# Patient Record
Sex: FEMALE | Race: WHITE | NOT HISPANIC OR LATINO | Employment: FULL TIME | URBAN - METROPOLITAN AREA
[De-identification: names, ages, dates, MRNs, and addresses within clinical notes are randomized per-mention and may not be internally consistent; named-entity substitution may affect disease eponyms.]

---

## 2023-10-20 PROBLEM — L71.9 ROSACEA, UNSPECIFIED: Status: ACTIVE | Noted: 2018-08-17

## 2023-10-20 PROBLEM — R20.2 PARESTHESIA OF SKIN: Status: ACTIVE | Noted: 2018-08-17

## 2023-10-20 PROBLEM — I83.12 VARICOSE VEINS OF LEFT LOWER EXTREMITY WITH INFLAMMATION: Status: ACTIVE | Noted: 2018-08-17

## 2023-10-20 PROBLEM — M25.569 KNEE JOINT PAIN: Status: ACTIVE | Noted: 2023-03-01

## 2023-10-20 PROBLEM — L85.8 OTHER SPECIFIED EPIDERMAL THICKENING: Status: ACTIVE | Noted: 2018-08-17

## 2023-10-20 RX ORDER — DOXYCYCLINE 40 MG/1
40 CAPSULE ORAL
COMMUNITY
Start: 2017-04-07 | End: 2024-02-19 | Stop reason: WASHOUT

## 2023-10-20 RX ORDER — ESTRADIOL 0.1 MG/G
1 CREAM VAGINAL
COMMUNITY
Start: 2023-08-20 | End: 2023-11-18

## 2023-10-20 RX ORDER — AMMONIUM LACTATE 12 G/100G
1 CREAM TOPICAL
COMMUNITY
Start: 2017-04-07 | End: 2024-02-19 | Stop reason: WASHOUT

## 2023-10-20 RX ORDER — SERTRALINE HYDROCHLORIDE 100 MG/1
100 TABLET, FILM COATED ORAL DAILY
COMMUNITY
Start: 2022-11-01

## 2023-10-20 RX ORDER — CAMPHOR AND MENTHOL 5; 5 MG/ML; MG/ML
1 LOTION TOPICAL
COMMUNITY
Start: 2018-06-15 | End: 2024-02-19 | Stop reason: WASHOUT

## 2023-10-20 RX ORDER — METRONIDAZOLE 7.5 MG/G
CREAM TOPICAL
COMMUNITY
Start: 2018-06-15 | End: 2024-03-27 | Stop reason: ALTCHOICE

## 2023-10-20 RX ORDER — DOXYCYCLINE 100 MG/1
100 CAPSULE ORAL
COMMUNITY
Start: 2016-07-18 | End: 2024-02-19 | Stop reason: WASHOUT

## 2023-10-23 ENCOUNTER — OFFICE VISIT (OUTPATIENT)
Dept: ORTHOPEDIC SURGERY | Facility: CLINIC | Age: 58
End: 2023-10-23
Payer: COMMERCIAL

## 2023-10-23 DIAGNOSIS — M17.12 PRIMARY OSTEOARTHRITIS OF LEFT KNEE: Primary | ICD-10-CM

## 2023-10-23 DIAGNOSIS — M25.562 ARTHRALGIA OF LEFT KNEE: ICD-10-CM

## 2023-10-23 PROCEDURE — 99214 OFFICE O/P EST MOD 30 MIN: CPT | Performed by: FAMILY MEDICINE

## 2023-10-23 PROCEDURE — 99204 OFFICE O/P NEW MOD 45 MIN: CPT | Performed by: FAMILY MEDICINE

## 2023-10-23 PROCEDURE — 2500000005 HC RX 250 GENERAL PHARMACY W/O HCPCS: Performed by: FAMILY MEDICINE

## 2023-10-23 PROCEDURE — 20611 DRAIN/INJ JOINT/BURSA W/US: CPT | Mod: LT | Performed by: FAMILY MEDICINE

## 2023-10-23 PROCEDURE — 2500000004 HC RX 250 GENERAL PHARMACY W/ HCPCS (ALT 636 FOR OP/ED): Performed by: FAMILY MEDICINE

## 2023-10-23 PROCEDURE — 2500000004 HC RX 250 GENERAL PHARMACY W/ HCPCS (ALT 636 FOR OP/ED)

## 2023-10-23 RX ORDER — INDOMETHACIN 75 MG/1
75 CAPSULE, EXTENDED RELEASE ORAL
Qty: 60 CAPSULE | Refills: 1 | Status: SHIPPED | OUTPATIENT
Start: 2023-10-23 | End: 2023-12-11 | Stop reason: SDUPTHER

## 2023-10-23 NOTE — LETTER
October 27, 2023     Rocky Logan MD  [id number] [facility] [address]  [facility] [address]    Patient: Macie Gavin   YOB: 1965   Date of Visit: 10/23/2023       Dear Dr. Rocky Logan MD:    Thank you for referring Macie aGvin to me for evaluation. Below are my notes for this consultation.  If you have questions, please do not hesitate to call me. I look forward to following your patient along with you.       Sincerely,     Chuck Bess MD      CC: Isreal Lares MD  ______________________________________________________________________________________    Sports Medicine Office Note    Today's Date:  10/23/2023     HPI: Macie Gavin is a 57 y.o.  who presents today for evaluation of left knee pain and swelling upon referral by her rheumatologist.    She complains of over 6 months of left knee pain that began in March 2023 when cleaning house and kneeling.  She denies direct injury or trauma.  She describes anterior medial pain with standing from a sitting position.  She has 0/10 pain at rest.  She does have daily pain with standing.  She gets occasional swelling.  She gets crackling.  She is afraid she has a loose body.  She previously had treatment with oral steroids and then meloxicam by her PCP.  She stopped the meloxicam 3 weeks ago.  She recently had an MRI of her left knee and was referred here for further treatment.  She denies recent or remote injury or trauma to this knee.  She has no problems with the opposite knee.    She has no other complaints.    Review of Systems  Constitutional: no fever, no chills, not feeling tired, no recent weight gain and no recent weight loss.   ENT: no nosebleeds.   Cardiovascular: no chest pain.   Respiratory: no shortness of breath and no cough.   Gastrointestinal: no abdominal pain, no nausea, no diarrhea and no vomiting.   Musculoskeletal: as noted in HPI and no arthralgias.   Integumentary: no rashes and no skin wound.    Neurological: no headache.   Psychiatric: no sleep disturbances and no depression.   Endocrine: no muscle weakness and no muscle cramps.   Hematologic/Lymphatic: no swollen glands and no tendency for easy bruising.    Physical Examination:     The RIGHT knee is nontender and stable.  The LEFT knee has trace to grade 1 joint effusion. Patella crepitus and grind are positive. There is no tenderness to the medial and lateral joint lines.  Michelle's is negative.  Flexion and extension are without mechanical blocking. There is no instability with stress testing.   Skin - no rashes, sores, or open lesions. Strength, sensory and vascular exams are otherwise normal. There is no clubbing, cyanosis or edema.  Gait is slightly antalgic and tandem.    Constitutional: General appearance = Alert and in no acute distress. Well developed, well nourished.   Head and face: Normal.    Eyes: External Eye, Conjunctiva and Lids=Normal external exam; pupils were equal in size, round, reactive to light (PERRL) and extraocular movements intact (EOMI).   Ears, Nose, Mouth, and Throat: External inspection of ears and nose=Normal.   Hearing= Normal.    Neck: No neck mass was observed. Supple.   Pulmonary: Respiratory effort = No respiratory distress.   Cardiovascular: Examination of extremities= No peripheral edema.   Skin and subcutaneous tissue: Normal skin color and pigmentation, normal skin turgor, and no rash; palpation of skin and subcutaneous tissue=Normal.    Neurologic: Sensation= Normal.    Psychiatric: Judgment and insight= Intact.  Orientation to person, place, and time: Alert and oriented x 3.  Mood and affect= Normal.    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  The studies were reviewed by me personally in the office today.    Procedure:  After consent was obtained, the left knee was prepped in a sterile fashion.  Ultrasound guidance was used to help insure proper needle placement into the knee joint, decrease patient discomfort, and decrease collateral damage. The joint was visualized and Depo-Medrol 80 mg with lidocaine 4 mL & ropivacaine 4 mL were injected without any complications. Ultrasound images were saved on an internal file for later reference. The patient tolerated the procedure well and the area was cleaned and bandaged.    Problem List Items Addressed This Visit             ICD-10-CM    Knee joint pain M25.569    Relevant Orders    Point of Care Ultrasound (Completed)    Primary osteoarthritis of left knee - Primary M17.12    Relevant Medications    indomethacin SR (Indocin SR) 75 mg ER capsule       Assessment and Plan:     We reviewed the exam and x-ray findings and discussed the conservative and surgical treatment options. We agreed to treat her knee pain and swelling conservatively at this time.  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  She is not a candidate for surgery at this time.  She should do well with the cortisone injection.  We will start her on indomethacin for breakthrough pain.  She will follow-up in 1 month or sooner for recheck.    **This note was dictated using Dragon speech recognition software and was not corrected for spelling or grammatical errors**.    Chuck Bess MD  Sports Medicine Specialist  [facility] [place]

## 2023-10-23 NOTE — PROGRESS NOTES
NPV- L knee pain    Patient ID: Macie Gavin is a 57 y.o. female.    L Inj/Asp: L knee on 10/27/2023 8:00 AM  Indications: pain  Details: 22 G needle, ultrasound-guided superolateral approach  Medications: 4 mL lidocaine 10 mg/mL (1 %); 4 mL ROPivacaine (PF) 100 mg/20 mL (5 mg/mL) 0.5 %; 80 mg methylPREDNISolone acetate 80 mg/mL  Procedure, treatment alternatives, risks and benefits explained, specific risks discussed. Consent was given by the patient. Immediately prior to procedure a time out was called to verify the correct patient, procedure, equipment, support staff and site/side marked as required. Patient was prepped and draped in the usual sterile fashion.

## 2023-10-26 NOTE — PROGRESS NOTES
Sports Medicine Office Note    Today's Date:  10/23/2023     HPI: Macie Gavin is a 57 y.o.  who presents today for evaluation of left knee pain and swelling upon referral by her rheumatologist.    She complains of over 6 months of left knee pain that began in March 2023 when cleaning house and kneeling.  She denies direct injury or trauma.  She describes anterior medial pain with standing from a sitting position.  She has 0/10 pain at rest.  She does have daily pain with standing.  She gets occasional swelling.  She gets crackling.  She is afraid she has a loose body.  She previously had treatment with oral steroids and then meloxicam by her PCP.  She stopped the meloxicam 3 weeks ago.  She recently had an MRI of her left knee and was referred here for further treatment.  She denies recent or remote injury or trauma to this knee.  She has no problems with the opposite knee.    She has no other complaints.    Review of Systems  Constitutional: no fever, no chills, not feeling tired, no recent weight gain and no recent weight loss.   ENT: no nosebleeds.   Cardiovascular: no chest pain.   Respiratory: no shortness of breath and no cough.   Gastrointestinal: no abdominal pain, no nausea, no diarrhea and no vomiting.   Musculoskeletal: as noted in HPI and no arthralgias.   Integumentary: no rashes and no skin wound.   Neurological: no headache.   Psychiatric: no sleep disturbances and no depression.   Endocrine: no muscle weakness and no muscle cramps.   Hematologic/Lymphatic: no swollen glands and no tendency for easy bruising.    Physical Examination:     The RIGHT knee is nontender and stable.  The LEFT knee has trace to grade 1 joint effusion. Patella crepitus and grind are positive. There is no tenderness to the medial and lateral joint lines.  Michelle's is negative.  Flexion and extension are without mechanical blocking. There is no instability with stress testing.   Skin - no rashes, sores, or  open lesions. Strength, sensory and vascular exams are otherwise normal. There is no clubbing, cyanosis or edema.  Gait is slightly antalgic and tandem.    Constitutional: General appearance = Alert and in no acute distress. Well developed, well nourished.   Head and face: Normal.    Eyes: External Eye, Conjunctiva and Lids=Normal external exam; pupils were equal in size, round, reactive to light (PERRL) and extraocular movements intact (EOMI).   Ears, Nose, Mouth, and Throat: External inspection of ears and nose=Normal.   Hearing= Normal.    Neck: No neck mass was observed. Supple.   Pulmonary: Respiratory effort = No respiratory distress.   Cardiovascular: Examination of extremities= No peripheral edema.   Skin and subcutaneous tissue: Normal skin color and pigmentation, normal skin turgor, and no rash; palpation of skin and subcutaneous tissue=Normal.    Neurologic: Sensation= Normal.    Psychiatric: Judgment and insight= Intact.  Orientation to person, place, and time: Alert and oriented x 3.  Mood and affect= Normal.    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  The studies were reviewed by me personally in the office today.    Procedure:  After consent was obtained, the left knee was prepped in a sterile fashion. Ultrasound guidance was used to help insure proper needle placement into the knee joint, decrease patient discomfort, and decrease collateral damage. The joint was visualized and Depo-Medrol 80 mg with lidocaine 4 mL & ropivacaine 4 mL were injected without any complications. Ultrasound images were saved on an internal file for later reference. The patient tolerated the procedure well and the area was cleaned and bandaged.    Problem List Items Addressed This Visit             ICD-10-CM    Knee joint pain M25.569    Relevant Orders    Point of Care Ultrasound (Completed)    Primary osteoarthritis  of left knee - Primary M17.12    Relevant Medications    indomethacin SR (Indocin SR) 75 mg ER capsule       Assessment and Plan:     We reviewed the exam and x-ray findings and discussed the conservative and surgical treatment options. We agreed to treat her knee pain and swelling conservatively at this time.  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  She is not a candidate for surgery at this time.  She should do well with the cortisone injection.  We will start her on indomethacin for breakthrough pain.  She will follow-up in 1 month or sooner for recheck.    **This note was dictated using Dragon speech recognition software and was not corrected for spelling or grammatical errors**.    Chuck Bess MD  Sports Medicine Specialist  [facility] [place]

## 2023-10-27 PROCEDURE — 2500000005 HC RX 250 GENERAL PHARMACY W/O HCPCS: Performed by: FAMILY MEDICINE

## 2023-10-27 PROCEDURE — 2500000004 HC RX 250 GENERAL PHARMACY W/ HCPCS (ALT 636 FOR OP/ED): Performed by: FAMILY MEDICINE

## 2023-10-27 PROCEDURE — 20611 DRAIN/INJ JOINT/BURSA W/US: CPT | Performed by: FAMILY MEDICINE

## 2023-10-27 RX ORDER — LIDOCAINE HYDROCHLORIDE 10 MG/ML
4 INJECTION INFILTRATION; PERINEURAL
Status: COMPLETED | OUTPATIENT
Start: 2023-10-27 | End: 2023-10-27

## 2023-10-27 RX ORDER — ROPIVACAINE HCL/PF 100MG/20ML
4 SYRINGE (ML) INJECTION
Status: COMPLETED | OUTPATIENT
Start: 2023-10-27 | End: 2023-10-27

## 2023-10-27 RX ORDER — METHYLPREDNISOLONE ACETATE 80 MG/ML
80 INJECTION, SUSPENSION INTRA-ARTICULAR; INTRALESIONAL; INTRAMUSCULAR; SOFT TISSUE
Status: COMPLETED | OUTPATIENT
Start: 2023-10-27 | End: 2023-10-27

## 2023-10-27 RX ADMIN — Medication 4 ML: at 08:00

## 2023-10-27 RX ADMIN — LIDOCAINE HYDROCHLORIDE 4 ML: 10 INJECTION, SOLUTION INFILTRATION; PERINEURAL at 08:00

## 2023-10-27 RX ADMIN — METHYLPREDNISOLONE ACETATE 80 MG: 80 INJECTION, SUSPENSION INTRA-ARTICULAR; INTRALESIONAL; INTRAMUSCULAR; SOFT TISSUE at 08:00

## 2023-12-11 ENCOUNTER — ANCILLARY PROCEDURE (OUTPATIENT)
Dept: RADIOLOGY | Facility: CLINIC | Age: 58
End: 2023-12-11
Payer: COMMERCIAL

## 2023-12-11 ENCOUNTER — OFFICE VISIT (OUTPATIENT)
Dept: ORTHOPEDIC SURGERY | Facility: CLINIC | Age: 58
End: 2023-12-11
Payer: COMMERCIAL

## 2023-12-11 DIAGNOSIS — M17.12 PRIMARY OSTEOARTHRITIS OF LEFT KNEE: ICD-10-CM

## 2023-12-11 DIAGNOSIS — M25.562 ARTHRALGIA OF LEFT KNEE: ICD-10-CM

## 2023-12-11 PROCEDURE — 99214 OFFICE O/P EST MOD 30 MIN: CPT | Performed by: FAMILY MEDICINE

## 2023-12-11 PROCEDURE — 73564 X-RAY EXAM KNEE 4 OR MORE: CPT | Mod: LT,FY

## 2023-12-11 PROCEDURE — 73564 X-RAY EXAM KNEE 4 OR MORE: CPT | Mod: LEFT SIDE | Performed by: RADIOLOGY

## 2023-12-11 RX ORDER — INDOMETHACIN 75 MG/1
75 CAPSULE, EXTENDED RELEASE ORAL
Qty: 60 CAPSULE | Refills: 1 | Status: SHIPPED | OUTPATIENT
Start: 2023-12-11 | End: 2024-02-09

## 2023-12-11 NOTE — PROGRESS NOTES
Sports Medicine Office Note    Today's Date:  12/11/2023     HPI: Macie Gavin is a 58 y.o.  who presents today for follow-up of left knee pain status post cortisone injection.    On 10/23/2023 she presents for Evaluation of left knee pain and swelling upon referral by her rheumatologist.  She complains of over 6 months of left knee pain that began in March 2023 when cleaning house and kneeling.  She denies direct injury or trauma.  She describes anterior medial pain with standing from a sitting position.  She has 0/10 pain at rest.  She does have daily pain with standing.  She gets occasional swelling.  She gets crackling.  She is afraid she has a loose body.  She previously had treatment with oral steroids and then meloxicam by her PCP.  She stopped the meloxicam 3 weeks ago.  She recently had an MRI of her left knee and was referred here for further treatment.  She denies recent or remote injury or trauma to this knee.  She has no problems with the opposite knee.  We agreed to treat her knee pain and swelling conservatively at this time.  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  She is not a candidate for surgery at this time.  She should do well with the cortisone injection.  We will start her on indomethacin for breakthrough pain.  She will follow-up in 1 month or sooner for recheck.    Today, 2/11/2023, she returns for 6-week follow-up of her left knee pain.  She reports 1.5 weeks of 100% improvement of her left knee pain after the cortisone injection.  At that point, the shot wore off and it has progressively worsened back to her baseline pain before the injection.  She was taking indomethacin once or twice a day for a month and then ran out.  She was told by the pharmacy she cannot get a refill until a few days from now.  She denies interval injury or trauma.  She is frustrated with her lack of improvement.    She has no  other complaints.      Physical Examination:     The RIGHT knee is nontender and stable.  The LEFT knee has trace  joint effusion. Patella crepitus and grind are positive. There is no tenderness to the medial and lateral joint lines.  Michelle's is negative.  Flexion and extension are without mechanical blocking. There is no instability with stress testing.   Skin - no rashes, sores, or open lesions. Strength, sensory and vascular exams are otherwise normal. There is no clubbing, cyanosis or edema.  Gait is slightly antalgic and tandem.      Imaging:  Radiographs of the left knee obtained today were reviewed and revealed moderate medial compartment and patellofemoral compartment osteoarthrosis.  There are no signs of acute fractures or dislocations.  The studies were reviewed by me personally in the office today.    Problem List Items Addressed This Visit             ICD-10-CM    Knee joint pain M25.569    Relevant Medications    indomethacin SR (Indocin SR) 75 mg ER capsule    Other Relevant Orders    XR knee left 4+ views    Primary osteoarthritis of left knee M17.12    Relevant Medications    indomethacin SR (Indocin SR) 75 mg ER capsule       Assessment and Plan:     We reviewed the exam and x-ray findings and discussed the conservative and surgical treatment options. We agreed that her relief from cortisone was very minimal.  I do not feel she is in need of joint replacement but clearly has a degenerative medial meniscus tear seen on recent MRI from an outside facility.  I recommend a surgical consultation with Dr. Sexton to see if she would benefit from partial meniscectomy before trying viscosupplementation.  She was given a refill of indomethacin.  I am happy to see her back to try gel injections in the future.    **This note was dictated using Dragon speech recognition software and was not corrected for spelling or grammatical errors**.    Chuck Bess MD  Sports Medicine Specialist  [facility]  [facility] [place]

## 2024-01-10 ENCOUNTER — TELEPHONE (OUTPATIENT)
Dept: ORTHOPEDIC SURGERY | Facility: HOSPITAL | Age: 59
End: 2024-01-10
Payer: COMMERCIAL

## 2024-01-15 ENCOUNTER — APPOINTMENT (OUTPATIENT)
Dept: ORTHOPEDIC SURGERY | Facility: CLINIC | Age: 59
End: 2024-01-15
Payer: COMMERCIAL

## 2024-01-16 ENCOUNTER — OFFICE VISIT (OUTPATIENT)
Dept: ORTHOPEDIC SURGERY | Facility: CLINIC | Age: 59
End: 2024-01-16
Payer: COMMERCIAL

## 2024-01-16 DIAGNOSIS — M23.204 OLD TEAR OF MEDIAL MENISCUS OF LEFT KNEE, UNSPECIFIED TEAR TYPE: Primary | ICD-10-CM

## 2024-01-16 PROCEDURE — 99213 OFFICE O/P EST LOW 20 MIN: CPT | Performed by: STUDENT IN AN ORGANIZED HEALTH CARE EDUCATION/TRAINING PROGRAM

## 2024-01-16 PROCEDURE — 1036F TOBACCO NON-USER: CPT | Performed by: STUDENT IN AN ORGANIZED HEALTH CARE EDUCATION/TRAINING PROGRAM

## 2024-01-16 ASSESSMENT — PAIN SCALES - GENERAL: PAINLEVEL_OUTOF10: 3

## 2024-01-16 ASSESSMENT — PAIN - FUNCTIONAL ASSESSMENT: PAIN_FUNCTIONAL_ASSESSMENT: 0-10

## 2024-01-16 ASSESSMENT — PAIN DESCRIPTION - DESCRIPTORS: DESCRIPTORS: BURNING

## 2024-02-01 NOTE — PROGRESS NOTES
PRIMARY CARE PHYSICIAN: Rocky Logan MD  REFERRING PROVIDER: No referring provider defined for this encounter.     CONSULT ORTHOPAEDIC: Knee Evaluation    ASSESSMENT & PLAN    Impression/Plan: This is a 58-year-old female with several months of left medial sided knee pain.  Clinical history, physical examination, and MRI imaging confirms radial tear of the medial meniscus with chondral defect of the medial tibial plateau.  At this time, we have elected to move forward with operative management provider a course of a home exercise program as provided by the [facility].  She will follow-up in roughly 6 weeks time.  For further evaluation and discussion of further management options.  I have also asked her to return with a copy of her MRI so I can evaluate personally at the next visit.      SUBJECTIVE  CHIEF COMPLAINT:   Chief Complaint   Patient presents with    Left Knee - Pain     NPV - Ref by Dr. Bess for torn meniscus of left knee.         HPI: Macie Gavin is a 58 y.o. patient.  The patient presents for several months of left medial sided knee pain.  She does report mechanical symptoms of clicking and popping.  There is no previous history of injury, surgery to the knee.  No history of injections to the knee.  She does take occasional oral anti-inflammatories for pain relief.  She comes with an MRI report from the [facility], however we are unable to see the images today.  MRI report is read as radial tear of the medial meniscus, chondral defect of the medial plateau, and chondromalacia of the patella.  She presents today for further evaluation.      REVIEW OF SYSTEMS  Constitutional: See HPI for pain assessment, No significant weight loss, recent trauma  Cardiovascular: No chest pain, shortness of breath  Respiratory: No difficulty breathing, cough  Gastrointestinal: No nausea, vomiting, diarrhea, constipation  Musculoskeletal: Noted in HPI, positive for pain, restricted motion, stiffness  Integumentary: No  rashes, easy bruising, redness   Neurological: no numbness or tingling in extremities, no gait disturbances   Psychiatric: No mood changes, memory changes, social issues  Heme/Lymph: no excessive swelling, easy bruising, excessive bleeding  ENT: No hearing changes  Eyes: No vision changes    History reviewed. No pertinent past medical history.     No Known Allergies     History reviewed. No pertinent surgical history.     No family history on file.     Social History     Socioeconomic History    Marital status:      Spouse name: Not on file    Number of children: Not on file    Years of education: Not on file    Highest education level: Not on file   Occupational History    Not on file   Tobacco Use    Smoking status: Never    Smokeless tobacco: Never   Substance and Sexual Activity    Alcohol use: Not on file    Drug use: Not on file    Sexual activity: Not on file   Other Topics Concern    Not on file   Social History Narrative    Not on file     Social Determinants of Health     Financial Resource Strain: Not on file   Food Insecurity: Not on file   Transportation Needs: Not on file   Physical Activity: Not on file   Stress: Not on file   Social Connections: Not on file   Intimate Partner Violence: Not on file   Housing Stability: Not on file        CURRENT MEDICATIONS:   Current Outpatient Medications   Medication Sig Dispense Refill    ammonium lactate (Amlactin) 12 % cream Apply 1 Application topically.      camphor-menthoL (Sarna OriginaL) lotion Apply 1 Application topically.      doxycycline (Oracea) 40 mg DR capsule Take 1 capsule (40 mg) by mouth.      doxycycline (Vibramycin) 100 mg capsule Take 1 capsule (100 mg) by mouth.      estradiol (Estrace) 0.01 % (0.1 mg/gram) vaginal cream Insert 1 g into the vagina.  1-2 times per week as directed.      indomethacin SR (Indocin SR) 75 mg ER capsule Take 1 capsule (75 mg) by mouth 2 times a day with meals. Do not crush or chew. 60 capsule 1     metroNIDAZOLE (Metrocream) 0.75 % cream 0      sertraline (Zoloft) 100 mg tablet Take 1 tablet (100 mg) by mouth once daily.       No current facility-administered medications for this visit.        OBJECTIVE    PHYSICAL EXAM       No data to display               There is no height or weight on file to calculate BMI.    GENERAL: A/Ox3, NAD. Appears healthy, well nourished  PSYCHIATRIC: Mood stable, appropriate memory recall  EYES: EOM intact, no scleral icterus  CARDIAC: regular rate  LUNGS: Breathing non-labored  SKIN: no erythema, rashes, or ecchymoses     MUSCULOSKELETAL:  Laterality: left Knee Exam  This is a well-appearing patient in no acute distress.  There is no effusion to the knee.  There is mild tenderness to palpation along the medial joint line, no tenderness to palpation along lateral joint line.  Range of motion: 0 to 120 degrees with pain along the medial joint line at terminal flexion.  There is no pain with manipulation of patella.  Negative Lachman's exam.  The knee is stable to posterior stress.  The knee is stable to varus and valgus stress at both 0 and 30 degrees knee flexion.  5/5 Strength TA, EHL, GS    NEUROVASCULAR:  - Neurovascular Status: sensation intact to light touch distally, lower extremity motor intact  - Capillary refill brisk at extremities, Bilateral dorsalis pedis pulse 2+    Imaging: Multiple views of the affected left knee(s) demonstrate: Medial compartmental joint space narrowing.  The lateral compartment appears to be well-preserved.  No evidence of osteophytes within the patellofemoral joint.  No evidence of fracture.  X-rays were personally reviewed and interpreted by me.  Radiology reports were reviewed by me as well, if readily available at the time.        Juan Pablo Guy MD, MPH  Attending Surgeon  Sports Medicine Orthopaedic Surgery  [facility] [place]

## 2024-02-19 ENCOUNTER — OFFICE VISIT (OUTPATIENT)
Dept: DERMATOLOGY | Facility: CLINIC | Age: 59
End: 2024-02-19
Payer: COMMERCIAL

## 2024-02-19 DIAGNOSIS — L64.9 ANDROGENETIC ALOPECIA: ICD-10-CM

## 2024-02-19 DIAGNOSIS — L71.9 ROSACEA: Primary | ICD-10-CM

## 2024-02-19 PROCEDURE — 1036F TOBACCO NON-USER: CPT | Performed by: DERMATOLOGY

## 2024-02-19 PROCEDURE — 99204 OFFICE O/P NEW MOD 45 MIN: CPT | Performed by: DERMATOLOGY

## 2024-02-19 RX ORDER — METRONIDAZOLE 7.5 MG/G
1 CREAM TOPICAL DAILY
Qty: 45 G | Refills: 11 | Status: SHIPPED | OUTPATIENT
Start: 2024-02-19 | End: 2024-03-27 | Stop reason: ALTCHOICE

## 2024-02-19 ASSESSMENT — DERMATOLOGY QUALITY OF LIFE (QOL) ASSESSMENT
RATE HOW BOTHERED YOU ARE BY EFFECTS OF YOUR SKIN PROBLEMS ON YOUR ACTIVITIES (EG, GOING OUT, ACCOMPLISHING WHAT YOU WANT, WORK ACTIVITIES OR YOUR RELATIONSHIPS WITH OTHERS): 0 - NEVER BOTHERED
RATE HOW BOTHERED YOU ARE BY SYMPTOMS OF YOUR SKIN PROBLEM (EG, ITCHING, STINGING BURNING, HURTING OR SKIN IRRITATION): 0 - NEVER BOTHERED
WHAT SINGLE SKIN CONDITION LISTED BELOW IS THE PATIENT ANSWERING THE QUALITY-OF-LIFE ASSESSMENT QUESTIONS ABOUT: ROSACEA
ARE THERE EXCLUSIONS OR EXCEPTIONS FOR THE QUALITY OF LIFE ASSESSMENT: NO
RATE HOW EMOTIONALLY BOTHERED YOU ARE BY YOUR SKIN PROBLEM (FOR EXAMPLE, WORRY, EMBARRASSMENT, FRUSTRATION): 0 - NEVER BOTHERED
DATE THE QUALITY-OF-LIFE ASSESSMENT WAS COMPLETED: 66889

## 2024-02-19 ASSESSMENT — DERMATOLOGY PATIENT ASSESSMENT
ARE YOU AN ORGAN TRANSPLANT RECIPIENT: NO
WHERE ARE THESE NEW OR CHANGING LESIONS LOCATED: HAIR THINNING
HAVE YOU HAD OR DO YOU HAVE VASCULAR DISEASE: NO
HAVE YOU HAD OR DO YOU HAVE A STAPH INFECTION: NO
DO YOU USE A TANNING BED: YES, PREVIOUSLY
DO YOU HAVE ANY NEW OR CHANGING LESIONS: YES

## 2024-02-19 ASSESSMENT — ITCH NUMERIC RATING SCALE: HOW SEVERE IS YOUR ITCHING?: 0

## 2024-02-19 ASSESSMENT — PATIENT GLOBAL ASSESSMENT (PGA): PATIENT GLOBAL ASSESSMENT: PATIENT GLOBAL ASSESSMENT:  1 - CLEAR

## 2024-02-19 NOTE — PROGRESS NOTES
Subjective   Macie Gavin is a 58 y.o. female who presents for the following: Rosacea and Alopecia.    Rosacea  Symptoms have been ongoing for about several years.  The skin changes are primarily located on the face. Her skin changes are described as red bumps. Flushing of her face occurs occasionally. Previous treatment has included metronidazole with good improvement. She is need of refills.    Alopecia  She complains of hair loss. The hair loss is localized in distribution, with onset approximately 2 years ago. She describes symptoms of hair thinning. She denies scalp itch and scalp scaling. She does not have family history of hair loss. She does not have dietary restrictions. She does wear a high tension hair style. She had no serious medical illnesses or major weight loss during time of hair loss. She has not tried any treatment for hair loss or thinning.        Objective   Well appearing patient in no apparent distress; mood and affect are within normal limits.    A focused examination was performed including face and hair. All findings within normal limits unless otherwise noted below.    Head - Anterior (Face)  Mid face erythema with telangiectasias and scattered inflammatory papules.    Scalp  Mild decreased hair density of vertex scalp with no surface changes of the scalp or hair follicles      Assessment/Plan   Rosacea  Head - Anterior (Face)    -Last seen 2018 and has been on metrocream 5 days/week that manages her condition well  -Will provide refills today     metroNIDAZOLE (Metrocream) 0.75 % cream - Head - Anterior (Face)  Apply 1 Application topically once daily.    Androgenetic alopecia  Scalp    -Discussed relationship to hormones, especially during menopause  -Patient has not tried any treatment yet and denies itching or burning  -CBC wnl, patient states last TSH was normal and will be getting another during annual exam in May  -Recommend OTC topical minoxidil 5% 1-2 times daily if  interested in treatment, however counseled that if not bothering patient, she does not need to treat  -Advised patient that if she does start minoxidil and then stops, she may lose any hair that was gained  -Discussed that there are stronger oral treatments, however patient defers these for now    Violet Law MD  PGY-4 Dermatology    RTC in 1 year    I saw and evaluated the patient. I personally obtained the key and critical portions of the history and physical exam or was physically present for key and critical portions performed by the student/resident. I reviewed the student/resident's documentation and discussed the patient with the student/resident. I was present for the entirety of any procedure(s). I agree with the student/resident's medical decision making as documented in the note.

## 2024-02-20 ENCOUNTER — OFFICE VISIT (OUTPATIENT)
Dept: ORTHOPEDIC SURGERY | Facility: CLINIC | Age: 59
End: 2024-02-20
Payer: COMMERCIAL

## 2024-02-20 DIAGNOSIS — M17.12 LOCALIZED OSTEOARTHRITIS OF LEFT KNEE: Primary | ICD-10-CM

## 2024-02-20 PROCEDURE — 1036F TOBACCO NON-USER: CPT | Performed by: STUDENT IN AN ORGANIZED HEALTH CARE EDUCATION/TRAINING PROGRAM

## 2024-02-20 PROCEDURE — 99213 OFFICE O/P EST LOW 20 MIN: CPT | Performed by: STUDENT IN AN ORGANIZED HEALTH CARE EDUCATION/TRAINING PROGRAM

## 2024-02-20 ASSESSMENT — PAIN SCALES - GENERAL: PAINLEVEL_OUTOF10: 0 - NO PAIN

## 2024-02-20 ASSESSMENT — PAIN - FUNCTIONAL ASSESSMENT: PAIN_FUNCTIONAL_ASSESSMENT: 0-10

## 2024-02-20 NOTE — PROGRESS NOTES
PRIMARY CARE PHYSICIAN: Rocky Logan MD  ORTHOPAEDIC FOLLOW-UP: Knee Evaluation    ASSESSMENT & PLAN    Impression: 58 y.o. female with left sided knee pain status post a corticosteroid injection and home exercise program.  She has also seen improvement with medial sided pain with the use of a medial  brace.  I have asked the patient to bring in a copy of her MRI for further evaluation, however I believe she may be a candidate for a left medial sided unicompartmental knee arthroplasty.  She will bring a copy of her MRI to the office for further evaluation I will contact her to discuss further management options.  All questions were answered.    Plan:   I reviewed with the patient the nature of their diagnosis.  I reviewed their imaging studies with them.    Based on the history, physical exam and imaging studies above, the patient's presentation is consistent with consistent with the above diagnosis.  I had a long discussion with the patient regarding their presentation and the treatment options.  We discussed initial nonoperative versus operative management options as well as potential further diagnostic imaging.     The patient will bring a copy of her MRI for further evaluation and I will contact her following my interpretation.    Follow-Up: Patient will follow-up following evaluation of MRI study.    At the end of the visit, all questions were answered in full. The patient is in agreement with the plan and recommendations. They will call the office with any questions/concerns.    Note dictated with [employer] software. Completed without full typed error editing and sent to avoid delay.     SUBJECTIVE  CHIEF COMPLAINT:   Chief Complaint   Patient presents with    Left Knee - Follow-up     Discuss possible surgery for left knee.         HPI: Macie Gavin is a 58 y.o. patient. Macie Gavin presents for follow-up of left medial sided knee pain.  She was previously evaluated on  1/16/2024 and diagnosed with a left knee medial sided chondral defect and radial meniscal tearing.  She was provided a home exercise program which she has completed with minimal relief of her symptoms.  She has achiness and occasional clicking/popping to the medial aspect of the knee.  She reports she does have a history of a single corticosteroid injection provided roughly 6 months ago with minimal relief of her symptoms over the course of 4 days.  She has been in a medial  brace which has relieved her symptoms, however she feels the brace is too bulky to continue utilizing.    She is interested in pursuing potential surgical intervention for this knee.  She does have a trip potentially planned for April and is trying to determine if she would prefer surgery for that date and postpone her vacation, or hold off on surgery until after her vacation.  She denies any acute injuries to the knee.    REVIEW OF SYSTEMS  Constitutional: See HPI for pain assessment, No significant weight loss, recent trauma  Cardiovascular: No chest pain, shortness of breath  Respiratory: No difficulty breathing, cough  Gastrointestinal: No nausea, vomiting, diarrhea, constipation  Musculoskeletal: Noted in HPI, positive for pain, restricted motion, stiffness  Integumentary: No rashes, easy bruising, redness   Neurological: no numbness or tingling in extremities, no gait disturbances   Psychiatric: No mood changes, memory changes, social issues  Heme/Lymph: no excessive swelling, easy bruising, excessive bleeding  ENT: No hearing changes  Eyes: No vision changes    History reviewed. No pertinent past medical history.     No Known Allergies     History reviewed. No pertinent surgical history.     No family history on file.     Social History     Socioeconomic History    Marital status:      Spouse name: Not on file    Number of children: Not on file    Years of education: Not on file    Highest education level: Not on file    Occupational History    Not on file   Tobacco Use    Smoking status: Never    Smokeless tobacco: Never   Substance and Sexual Activity    Alcohol use: Yes     Comment: Mixed drinks at least once a month    Drug use: Not on file    Sexual activity: Not on file   Other Topics Concern    Not on file   Social History Narrative    Not on file     Social Determinants of Health     Financial Resource Strain: Not on file   Food Insecurity: Not on file   Transportation Needs: Not on file   Physical Activity: Not on file   Stress: Not on file   Social Connections: Not on file   Intimate Partner Violence: Not on file   Housing Stability: Not on file        CURRENT MEDICATIONS:   Current Outpatient Medications   Medication Sig Dispense Refill    metroNIDAZOLE (Metrocream) 0.75 % cream Apply 1 Application topically once daily. 45 g 11    sertraline (Zoloft) 100 mg tablet Take 1 tablet (100 mg) by mouth once daily.      estradiol (Estrace) 0.01 % (0.1 mg/gram) vaginal cream Insert 1 g into the vagina.  1-2 times per week as directed.      metroNIDAZOLE (Metrocream) 0.75 % cream 0       No current facility-administered medications for this visit.        OBJECTIVE    PHYSICAL EXAM       No data to display               There is no height or weight on file to calculate BMI.    GENERAL: A/Ox3, NAD. Appears healthy, well nourished  PSYCHIATRIC: Mood stable, appropriate memory recall  EYES: EOM intact, no scleral icterus  CARDIOVASCULAR: Palpable peripheral pulses  LUNGS: Breathing non-labored on room air  SKIN: no erythema, rashes, or ecchymoses     MUSCULOSKELETAL:  Laterality: left Knee Exam  - Skin intact  - No erythema or warmth  - No ecchymosis or soft tissue swelling  - Alignment: neutral  - Palpation: There is significant tenderness palpation along the medial joint line.  No tenderness laterally or with manipulation of the patellofemoral joint.  - ROM: 0-120 with pain along the medial joint with terminal flexion.  - Effusion:  None.    - Strength: knee extension and flexion 5/5, EHL/PF/DF motor intact  - Stability:        Anterior drawer stable       Posterior drawer stable       Varus/valgus stable       negative Lachman  - Hip Exam: flexion to 100+ degrees, full extension, internal/external rotation adequate, and no pain with log roll    NEUROVASCULAR:  - Neurovascular Status: sensation intact to light touch distally, lower extremity motor intact  - Capillary refill brisk at extremities, Bilateral dorsalis pedis pulse 2+    Imaging: Multiple views of the affected left knee(s) demonstrate: Medial compartment joint space narrowing with subchondral sclerosis.  Lateral and patellofemoral compartments appear to be well-preserved.   X-rays were personally reviewed and interpreted by me.  Radiology reports were reviewed by me as well, if readily available at the time.      Juan aPblo Guy MD, MPH  Attending Surgeon    Sports Medicine Orthopaedic Surgery  [facility] [place]  [facility]

## 2024-02-27 ENCOUNTER — APPOINTMENT (OUTPATIENT)
Dept: ORTHOPEDIC SURGERY | Facility: CLINIC | Age: 59
End: 2024-02-27
Payer: COMMERCIAL

## 2024-02-27 ENCOUNTER — DOCUMENTATION (OUTPATIENT)
Dept: ORTHOPEDICS | Facility: HOSPITAL | Age: 59
End: 2024-02-27

## 2024-02-27 DIAGNOSIS — M17.10 LOCALIZED OSTEOARTHRITIS OF KNEE: Primary | ICD-10-CM

## 2024-02-27 NOTE — PROGRESS NOTES
This is documentation of a telephone conversation held with the patient on 2/27/2024.    The patient presented her MRI which was available for my evaluation.  There appears to be a complex/degenerative medial meniscus tear with chondral thinning of articular surface.  The lateral and patellofemoral compartments appear to be well-preserved.  The ACL and PCL are intact.    At this time, we discussed that I believe she would be an appropriate candidate for a left knee unicompartmental medial sided knee arthroplasty.  She has elected to hold off on a planned trip in April and would like to proceed with surgical intervention.  Will plan to obtain a CT for preoperative planning purposes, she will need to be evaluated by her primary care provider, and we will work on scheduling surgery date.  All questions were answered.    Terry Guy MD, MPH

## 2024-03-10 ENCOUNTER — HOSPITAL ENCOUNTER (OUTPATIENT)
Dept: RADIOLOGY | Facility: EXTERNAL LOCATION | Age: 59
Discharge: HOME | End: 2024-03-10

## 2024-03-12 ENCOUNTER — APPOINTMENT (OUTPATIENT)
Dept: RADIOLOGY | Facility: CLINIC | Age: 59
End: 2024-03-12
Payer: COMMERCIAL

## 2024-03-12 ENCOUNTER — HOSPITAL ENCOUNTER (OUTPATIENT)
Dept: RADIOLOGY | Facility: HOSPITAL | Age: 59
Discharge: HOME | End: 2024-03-12
Payer: COMMERCIAL

## 2024-03-12 DIAGNOSIS — M17.10 LOCALIZED OSTEOARTHRITIS OF KNEE: ICD-10-CM

## 2024-03-12 PROCEDURE — 73700 CT LOWER EXTREMITY W/O DYE: CPT | Mod: LT

## 2024-03-15 ENCOUNTER — PREP FOR PROCEDURE (OUTPATIENT)
Dept: ORTHOPEDIC SURGERY | Facility: CLINIC | Age: 59
End: 2024-03-15
Payer: COMMERCIAL

## 2024-03-15 DIAGNOSIS — M17.12 ARTHRITIS OF LEFT KNEE: Primary | ICD-10-CM

## 2024-03-20 ENCOUNTER — APPOINTMENT (OUTPATIENT)
Dept: PREADMISSION TESTING | Facility: HOSPITAL | Age: 59
End: 2024-03-20
Payer: COMMERCIAL

## 2024-03-25 ENCOUNTER — TELEPHONE (OUTPATIENT)
Dept: ORTHOPEDIC SURGERY | Facility: HOSPITAL | Age: 59
End: 2024-03-25
Payer: COMMERCIAL

## 2024-03-25 NOTE — TELEPHONE ENCOUNTER
I left a voicemail for Ms. Gavin  to call and schedule her preop joint replacement class at [phone].

## 2024-03-25 NOTE — TELEPHONE ENCOUNTER
Thank you for taking my call today.  All questions were answered at the time of the call, but please feel free to reach out to me via MyChart or phone, [phone], with any new questions or concerns.       We confirmed that you opted to enroll in our [id number] program so your discharge prescriptions will be available to take home at the time of discharge.  Please bring any prescription insurance coverage with you on the morning of surgery so that we can enter the information into our system.     We confirmed that your plan would be to Discharge Home same day (Rapid Recovery).    We confirmed that you need additional DME for recovery and have been referred to DME tech for additional assistance. As discussed with our medical equipment rep, Ale, she can get you crutches on the day of surgery.     Use the provided body wash for 4 days before surgery and complete a 5th shower on the morning of surgery, this includes your body and hair.  Follow the directions as provided during preadmission testing.  The mouth wash will be used the night before and the morning of surgery.       As a reminder, if you do not hear from our team, please call [phone] between 2pm and 3pm the business day before your surgery to confirm your arrival time and details.        Please don't hesitate to reach out with additional questions or concerns.    Idalia Atkins MBA, BSN, RN-BC  ESN Jarvis, RN  [facility]  [facility]  [phone]

## 2024-03-27 ENCOUNTER — LAB (OUTPATIENT)
Dept: LAB | Facility: LAB | Age: 59
End: 2024-03-27
Payer: COMMERCIAL

## 2024-03-27 ENCOUNTER — APPOINTMENT (OUTPATIENT)
Dept: PREADMISSION TESTING | Facility: HOSPITAL | Age: 59
End: 2024-03-27
Payer: COMMERCIAL

## 2024-03-27 ENCOUNTER — PRE-ADMISSION TESTING (OUTPATIENT)
Dept: PREADMISSION TESTING | Facility: HOSPITAL | Age: 59
End: 2024-03-27
Payer: COMMERCIAL

## 2024-03-27 ENCOUNTER — TELEPHONE (OUTPATIENT)
Dept: ORTHOPEDIC SURGERY | Facility: HOSPITAL | Age: 59
End: 2024-03-27
Payer: COMMERCIAL

## 2024-03-27 VITALS
TEMPERATURE: 98.6 F | OXYGEN SATURATION: 98 % | HEART RATE: 72 BPM | HEIGHT: 64 IN | BODY MASS INDEX: 32.71 KG/M2 | WEIGHT: 191.6 LBS | DIASTOLIC BLOOD PRESSURE: 79 MMHG | SYSTOLIC BLOOD PRESSURE: 122 MMHG

## 2024-03-27 DIAGNOSIS — Z01.818 PREOP TESTING: Primary | ICD-10-CM

## 2024-03-27 DIAGNOSIS — Z01.818 PREOP TESTING: ICD-10-CM

## 2024-03-27 LAB
ANION GAP SERPL CALC-SCNC: 15 MMOL/L
APPEARANCE UR: CLEAR
BILIRUB UR STRIP.AUTO-MCNC: NEGATIVE MG/DL
BUN SERPL-MCNC: 13 MG/DL (ref 8–25)
CALCIUM SERPL-MCNC: 9.7 MG/DL (ref 8.5–10.4)
CHLORIDE SERPL-SCNC: 104 MMOL/L (ref 97–107)
CO2 SERPL-SCNC: 24 MMOL/L (ref 24–31)
COLOR UR: ABNORMAL
CREAT SERPL-MCNC: 0.8 MG/DL (ref 0.4–1.6)
EGFRCR SERPLBLD CKD-EPI 2021: 86 ML/MIN/1.73M*2
GLUCOSE SERPL-MCNC: 85 MG/DL (ref 65–99)
GLUCOSE UR STRIP.AUTO-MCNC: NORMAL MG/DL
KETONES UR STRIP.AUTO-MCNC: NEGATIVE MG/DL
LEUKOCYTE ESTERASE UR QL STRIP.AUTO: ABNORMAL
MUCOUS THREADS #/AREA URNS AUTO: NORMAL /LPF
NITRITE UR QL STRIP.AUTO: NEGATIVE
PH UR STRIP.AUTO: 5.5 [PH]
POTASSIUM SERPL-SCNC: 4.8 MMOL/L (ref 3.4–5.1)
PROT UR STRIP.AUTO-MCNC: NEGATIVE MG/DL
RBC # UR STRIP.AUTO: ABNORMAL /UL
RBC #/AREA URNS AUTO: NORMAL /HPF
SODIUM SERPL-SCNC: 143 MMOL/L (ref 133–145)
SP GR UR STRIP.AUTO: 1.02
SQUAMOUS #/AREA URNS AUTO: NORMAL /HPF
UROBILINOGEN UR STRIP.AUTO-MCNC: NORMAL MG/DL
WBC #/AREA URNS AUTO: NORMAL /HPF

## 2024-03-27 PROCEDURE — 36415 COLL VENOUS BLD VENIPUNCTURE: CPT

## 2024-03-27 PROCEDURE — 80048 BASIC METABOLIC PNL TOTAL CA: CPT

## 2024-03-27 PROCEDURE — 99204 OFFICE O/P NEW MOD 45 MIN: CPT | Performed by: NURSE PRACTITIONER

## 2024-03-27 PROCEDURE — 87086 URINE CULTURE/COLONY COUNT: CPT

## 2024-03-27 PROCEDURE — 81001 URINALYSIS AUTO W/SCOPE: CPT

## 2024-03-27 PROCEDURE — 87081 CULTURE SCREEN ONLY: CPT | Mod: WESLAB

## 2024-03-27 RX ORDER — BUDESONIDE AND FORMOTEROL FUMARATE DIHYDRATE 160; 4.5 UG/1; UG/1
2 AEROSOL RESPIRATORY (INHALATION) DAILY PRN
COMMUNITY

## 2024-03-27 RX ORDER — ERGOCALCIFEROL 1.25 MG/1
1 CAPSULE ORAL
COMMUNITY
Start: 2024-03-08

## 2024-03-27 RX ORDER — IBUPROFEN 800 MG/1
800 TABLET ORAL EVERY 8 HOURS PRN
COMMUNITY

## 2024-03-27 RX ORDER — CHLORHEXIDINE GLUCONATE ORAL RINSE 1.2 MG/ML
SOLUTION DENTAL
Qty: 473 ML | Refills: 0 | Status: SHIPPED | OUTPATIENT
Start: 2024-03-31 | End: 2024-04-01

## 2024-03-27 RX ORDER — MELOXICAM 15 MG/1
15 TABLET ORAL DAILY
COMMUNITY

## 2024-03-27 RX ORDER — ALBUTEROL SULFATE 90 UG/1
2 AEROSOL, METERED RESPIRATORY (INHALATION) EVERY 6 HOURS PRN
COMMUNITY
Start: 2023-04-10

## 2024-03-27 ASSESSMENT — ENCOUNTER SYMPTOMS
EYES NEGATIVE: 1
CONSTITUTIONAL NEGATIVE: 1
HEMATOLOGIC/LYMPHATIC NEGATIVE: 1
RESPIRATORY NEGATIVE: 1
PSYCHIATRIC NEGATIVE: 1
ENDOCRINE NEGATIVE: 1
NEUROLOGICAL NEGATIVE: 1
GASTROINTESTINAL NEGATIVE: 1
CARDIOVASCULAR NEGATIVE: 1

## 2024-03-27 ASSESSMENT — CHADS2 SCORE
AGE GREATER THAN OR EQUAL TO 75: NO
CHADS2 SCORE: 0
CHF: NO
HYPERTENSION: NO
PRIOR STROKE OR TIA OR THROMBOEMBOLISM: NO
DIABETES: NO

## 2024-03-27 ASSESSMENT — DUKE ACTIVITY SCORE INDEX (DASI)
CAN YOU DO LIGHT WORK AROUND THE HOUSE LIKE DUSTING OR WASHING DISHES: YES
CAN YOU WALK INDOORS, SUCH AS AROUND YOUR HOUSE: YES
TOTAL_SCORE: 36.7
CAN YOU DO YARD WORK LIKE RAKING LEAVES, WEEDING OR PUSHING A MOWER: YES
CAN YOU DO HEAVY WORK AROUND THE HOUSE LIKE SCRUBBING FLOORS OR LIFTING AND MOVING HEAVY FURNITURE: YES
CAN YOU WALK A BLOCK OR TWO ON LEVEL GROUND: YES
CAN YOU RUN A SHORT DISTANCE: NO
DASI METS SCORE: 7.3
CAN YOU PARTICIPATE IN MODERATE RECREATIONAL ACTIVITIES LIKE GOLF, BOWLING, DANCING, DOUBLES TENNIS OR THROWING A BASEBALL OR FOOTBALL: NO
CAN YOU DO MODERATE WORK AROUND THE HOUSE LIKE VACUUMING, SWEEPING FLOORS OR CARRYING GROCERIES: YES
CAN YOU TAKE CARE OF YOURSELF (EAT, DRESS, BATHE, OR USE TOILET): YES
CAN YOU PARTICIPATE IN STRENOUS SPORTS LIKE SWIMMING, SINGLES TENNIS, FOOTBALL, BASKETBALL, OR SKIING: NO
CAN YOU CLIMB A FLIGHT OF STAIRS OR WALK UP A HILL: YES
CAN YOU HAVE SEXUAL RELATIONS: YES

## 2024-03-27 ASSESSMENT — PAIN - FUNCTIONAL ASSESSMENT: PAIN_FUNCTIONAL_ASSESSMENT: 0-10

## 2024-03-27 ASSESSMENT — PAIN DESCRIPTION - DESCRIPTORS: DESCRIPTORS: SHARP

## 2024-03-27 ASSESSMENT — PAIN SCALES - GENERAL: PAINLEVEL_OUTOF10: 4

## 2024-03-27 NOTE — H&P (VIEW-ONLY)
CPM/PAT Evaluation       Name: Macie Gavin (Macie Gavin)  /Age: 1965/58 y.o.     In-Person       Chief Complaint: left knee pain    HPI    Pt is a 58 year old female who reports left knee pain that started several months ago but has progressively worsened. Pt stated the left knee pain is primarily along the medial side of her left knee. She describes the pain as an aching pain that radiates up her left thigh. She also reports occasional popping in her knee. She has tried a cortisone injection in her left knee and a home exercise program with no improvement of her knee pain. Pt has been taking Meloxicam and Ibuprofen with some improvement of the pain. Pt completed MRI imaging and was examined by her surgeon. She has been scheduled for left partial knee arthroplasty. Pt denies CP, SOB, or dizziness.     Past Medical History:   Diagnosis Date    Anxiety     Depression     Exercise-induced asthma     Insomnia     OCD (obsessive compulsive disorder)        Past Surgical History:   Procedure Laterality Date    BLADDER SUSPENSION      CERVICAL FUSION       SECTION, LOW TRANSVERSE      COLONOSCOPY      HYSTERECTOMY      TUBAL LIGATION      VAGINAL PROLAPSE REPAIR       Social History     Tobacco Use    Smoking status: Never    Smokeless tobacco: Never   Substance Use Topics    Alcohol use: Yes     Comment: Mixed drinks at least once a month     Social History     Substance and Sexual Activity   Drug Use Never     No Known Allergies    Current Outpatient Medications   Medication Sig Dispense Refill    albuterol 90 mcg/actuation inhaler Inhale 2 puffs every 6 hours if needed.      ergocalciferol (Vitamin D-2) 1.25 MG (06863 UT) capsule Take 1 capsule (1,250 mcg) by mouth 1 (one) time per week. MONDAY      budesonide-formoteroL (Symbicort) 160-4.5 mcg/actuation inhaler Inhale 2 puffs once daily as needed (ASTHMA EXERCISE INDUCED).      [START ON 3/31/2024] chlorhexidine (Peridex) 0.12 % solution  "Use as directed. Do not start before March 31, 2024. 473 mL 0    estradiol (Estrace) 0.01 % (0.1 mg/gram) vaginal cream Insert 1 g into the vagina.  1-2 times per week as directed.      ibuprofen 800 mg tablet Take 1 tablet (800 mg) by mouth every 8 hours if needed for mild pain (1 - 3).      meloxicam (Mobic) 15 mg tablet Take 1 tablet (15 mg) by mouth once daily.      sertraline (Zoloft) 100 mg tablet Take 1 tablet (100 mg) by mouth once daily.       No current facility-administered medications for this visit.     Review of Systems   Constitutional: Negative.    HENT: Negative.     Eyes: Negative.    Respiratory: Negative.     Cardiovascular: Negative.    Gastrointestinal: Negative.    Endocrine: Negative.    Genitourinary: Negative.    Musculoskeletal:         Left knee pain    Skin: Negative.    Neurological: Negative.    Hematological: Negative.    Psychiatric/Behavioral: Negative.       /79   Pulse 72   Temp 37 °C (98.6 °F) (Temporal)   Ht 1.626 m (5' 4\")   Wt 86.9 kg (191 lb 9.6 oz)   SpO2 98%   BMI 32.89 kg/m²     Physical Exam  Constitutional:       Appearance: Normal appearance.   HENT:      Head: Normocephalic and atraumatic.      Nose: Nose normal.      Mouth/Throat:      Mouth: Mucous membranes are moist.      Pharynx: Oropharynx is clear.   Eyes:      Extraocular Movements: Extraocular movements intact.      Conjunctiva/sclera: Conjunctivae normal.      Pupils: Pupils are equal, round, and reactive to light.   Cardiovascular:      Rate and Rhythm: Normal rate and regular rhythm.      Pulses: Normal pulses.      Heart sounds: Normal heart sounds.   Pulmonary:      Effort: Pulmonary effort is normal.      Breath sounds: Normal breath sounds.   Abdominal:      General: Bowel sounds are normal.      Palpations: Abdomen is soft.   Musculoskeletal:      Cervical back: Normal range of motion and neck supple.      Comments: Left knee pain   Skin:     General: Skin is warm and dry.   Neurological: "      General: No focal deficit present.      Mental Status: She is alert and oriented to person, place, and time. Mental status is at baseline.   Psychiatric:         Mood and Affect: Mood normal.         Behavior: Behavior normal.         Thought Content: Thought content normal.         Judgment: Judgment normal.        ASA: 2  DASI: 36.7  METS: 7.3  CHADS: 1.9%  RCRI: 0.4%  STOP BAN    Assessment and Plan:     Arthritis of left knee: arthroplasty partial knee left  Exercise induced asthma: Pt is uses Symbicort and albuterol inhalers as needed.  Pt stated she usually only has to use her inhalers when she is outside in cold weather and with exercise.   Pt went to the ED in February for intermittent CP that lasted 3 days. ED performed workup and nothing was found. Pt stated her CP symptoms relieved after she had BM.   BMI: 32.89    PCP Dr. Logan clearance scanned into EPIC media.   Retrieve EKG from Dr. Logan's office scanned into [employer] Media scanned on 3/27/2024 under heading physician order.   CBC completed on 2024  Checking BMP in PAT.     ERICH Beyer-CNP

## 2024-03-27 NOTE — TELEPHONE ENCOUNTER
Talked to Mrs. Gavin and let her know that she is required to get her PAT's done, but if she brings in her documentation they likely will not need to order repeat labs/imaging. She has an appointment today @ 1:45 pm.

## 2024-03-27 NOTE — CPM/PAT H&P
CPM/PAT Evaluation       Name: Macie Gavin (Macie Gavin)  /Age: 1965/58 y.o.     In-Person       Chief Complaint: left knee pain    HPI    Pt is a 58 year old female who reports left knee pain that started several months ago but has progressively worsened. Pt stated the left knee pain is primarily along the medial side of her left knee. She describes the pain as an aching pain that radiates up her left thigh. She also reports occasional popping in her knee. She has tried a cortisone injection in her left knee and a home exercise program with no improvement of her knee pain. Pt has been taking Meloxicam and Ibuprofen with some improvement of the pain. Pt completed MRI imaging and was examined by her surgeon. She has been scheduled for left partial knee arthroplasty. Pt denies CP, SOB, or dizziness.     Past Medical History:   Diagnosis Date    Anxiety     Depression     Exercise-induced asthma     Insomnia     OCD (obsessive compulsive disorder)        Past Surgical History:   Procedure Laterality Date    BLADDER SUSPENSION      CERVICAL FUSION       SECTION, LOW TRANSVERSE      COLONOSCOPY      HYSTERECTOMY      TUBAL LIGATION      VAGINAL PROLAPSE REPAIR       Social History     Tobacco Use    Smoking status: Never    Smokeless tobacco: Never   Substance Use Topics    Alcohol use: Yes     Comment: Mixed drinks at least once a month     Social History     Substance and Sexual Activity   Drug Use Never     No Known Allergies    Current Outpatient Medications   Medication Sig Dispense Refill    albuterol 90 mcg/actuation inhaler Inhale 2 puffs every 6 hours if needed.      ergocalciferol (Vitamin D-2) 1.25 MG (59136 UT) capsule Take 1 capsule (1,250 mcg) by mouth 1 (one) time per week. MONDAY      budesonide-formoteroL (Symbicort) 160-4.5 mcg/actuation inhaler Inhale 2 puffs once daily as needed (ASTHMA EXERCISE INDUCED).      [START ON 3/31/2024] chlorhexidine (Peridex) 0.12 % solution  "Use as directed. Do not start before March 31, 2024. 473 mL 0    estradiol (Estrace) 0.01 % (0.1 mg/gram) vaginal cream Insert 1 g into the vagina.  1-2 times per week as directed.      ibuprofen 800 mg tablet Take 1 tablet (800 mg) by mouth every 8 hours if needed for mild pain (1 - 3).      meloxicam (Mobic) 15 mg tablet Take 1 tablet (15 mg) by mouth once daily.      sertraline (Zoloft) 100 mg tablet Take 1 tablet (100 mg) by mouth once daily.       No current facility-administered medications for this visit.     Review of Systems   Constitutional: Negative.    HENT: Negative.     Eyes: Negative.    Respiratory: Negative.     Cardiovascular: Negative.    Gastrointestinal: Negative.    Endocrine: Negative.    Genitourinary: Negative.    Musculoskeletal:         Left knee pain    Skin: Negative.    Neurological: Negative.    Hematological: Negative.    Psychiatric/Behavioral: Negative.       /79   Pulse 72   Temp 37 °C (98.6 °F) (Temporal)   Ht 1.626 m (5' 4\")   Wt 86.9 kg (191 lb 9.6 oz)   SpO2 98%   BMI 32.89 kg/m²     Physical Exam  Constitutional:       Appearance: Normal appearance.   HENT:      Head: Normocephalic and atraumatic.      Nose: Nose normal.      Mouth/Throat:      Mouth: Mucous membranes are moist.      Pharynx: Oropharynx is clear.   Eyes:      Extraocular Movements: Extraocular movements intact.      Conjunctiva/sclera: Conjunctivae normal.      Pupils: Pupils are equal, round, and reactive to light.   Cardiovascular:      Rate and Rhythm: Normal rate and regular rhythm.      Pulses: Normal pulses.      Heart sounds: Normal heart sounds.   Pulmonary:      Effort: Pulmonary effort is normal.      Breath sounds: Normal breath sounds.   Abdominal:      General: Bowel sounds are normal.      Palpations: Abdomen is soft.   Musculoskeletal:      Cervical back: Normal range of motion and neck supple.      Comments: Left knee pain   Skin:     General: Skin is warm and dry.   Neurological: "      General: No focal deficit present.      Mental Status: She is alert and oriented to person, place, and time. Mental status is at baseline.   Psychiatric:         Mood and Affect: Mood normal.         Behavior: Behavior normal.         Thought Content: Thought content normal.         Judgment: Judgment normal.        ASA: 2  DASI: 36.7  METS: 7.3  CHADS: 1.9%  RCRI: 0.4%  STOP BAN    Assessment and Plan:     Arthritis of left knee: arthroplasty partial knee left  Exercise induced asthma: Pt is uses Symbicort and albuterol inhalers as needed.  Pt stated she usually only has to use her inhalers when she is outside in cold weather and with exercise.   Pt went to the ED in February for intermittent CP that lasted 3 days. ED performed workup and nothing was found. Pt stated her CP symptoms relieved after she had BM.   BMI: 32.89    PCP Dr. Logan clearance scanned into EPIC media.   Retrieve EKG from Dr. Logan's office scanned into [employer] Media scanned on 3/27/2024 under heading physician order.   CBC completed on 2024  Checking BMP in PAT.     ERICH Beyer-CNP

## 2024-03-27 NOTE — PREPROCEDURE INSTRUCTIONS
Medication List            Accurate as of March 27, 2024  1:50 PM. Always use your most recent med list.                albuterol 90 mcg/actuation inhaler  Notes to patient: BRING TO HOSPITAL     budesonide-formoteroL 160-4.5 mcg/actuation inhaler  Commonly known as: Symbicort  Notes to patient: BRING TO HOSPITAL     ergocalciferol 1.25 MG (36980 UT) capsule  Commonly known as: Vitamin D-2  Medication Adjustments for Surgery: Stop 7 days before surgery     estradiol 0.01 % (0.1 mg/gram) vaginal cream  Commonly known as: Estrace  Notes to patient: PATIENT NOT USING     ibuprofen 800 mg tablet  Medication Adjustments for Surgery: Stop 7 days before surgery     meloxicam 15 mg tablet  Commonly known as: Mobic  Medication Adjustments for Surgery: Stop 7 days before surgery     sertraline 100 mg tablet  Commonly known as: Zoloft  Medication Adjustments for Surgery: Take morning of surgery with sip of water, no other fluids                              NPO Instructions:    Do not eat any food after midnight the night before your surgery/procedure.  You may have clear liquids until TWO hours before surgery/procedure. This includes water, black tea/coffee, (no milk or cream) apple juice and electrolyte drinks (Gatorade).    Additional Instructions:     Day of Surgery:  Review your medication instructions, take indicated medications  Wear  comfortable loose fitting clothing  Do not use moisturizers, creams, lotions or perfume  All jewelry and valuables should be left at home   Home Preoperative Antibacterial Shower    What is a home preoperative antibacterial shower?  This shower is a way of cleaning the skin with a germ killing solution before surgery. The solution contains chlorhexidine, commonly known as CHG. CHG is a skin cleanser with germ killing ability. Let your doctor know if you are allergic to chlorhexidine.      Why do I need to take a preoperative antibacterial shower?  Skin is not sterile. It is best to try  to make your skin as free of germs as possible before surgery. Proper cleansing with a germ killing soap before surgery can lower the number of germs on your skin. This helps to reduce the risk of infection at the surgical site. Following the instructions listed below will help you prepare your skin for surgery.    How do I use the solution?      Steps: Begin using your CHG soap MARCH 28  First, wash and rinse your hair Keep CHG away soap away from ear canals and eyes.  Rinse completely, do not condition. Hair extensions should be removed.  Wash your face with your normal soap and rinse.  Apply the CHG solution to a clean wet washcloth. Turn the water off or move away from the water spray to avoid premature rinsing of the CHG soap as you are applying.  Firmly lather your entire body from neck down. Do not use on face.  Pay special attention to the areas(s) where your incision(s) will be located unless they are on your face.  Avoid scrubbing your skin too hard.  The important point is to have the CHG soap sit on your skin for 3 minutes.  DO NOT wash with regular soap after you have used the CHG soap solution.  Pat yourself dry with a clean, freshly laundered towel.  DO NOT apply powders, deodorants or lotions.  Dress in clean, freshly laundered night clothes.  Be sure to sleep with clean, freshly laundered sheets.  Be aware that CHG will cause stains on fabrics; if you wash them with bleach after use. Rinse your washcloth and other linens that have contact with CHG completely. Use only non-chlorine detergents to launder the items used.  The morning of surgery is the fifth day. Repeat the above steps and dress in clean comfortable clothing.   Patient Information: Oral/Dental Rinse    What is oral/dental rinse?  It is a mouthwash. It is a way of cleaning the mouth with a germ killing solution before your surgery. The solution contains chlorhexidine, commonly know as CHG.  It is used inside the mouth to kill bacteria  known as Staphylococcus aureus.  Let your doctor know if you are allergic to chlorhexidine.    Why do I need to use CHG oral/dental rinse?  The CHG oral/dental rinse helps to kill bacteria in your mouth known as Staphylococcus aureus. This reduces the risk of infection at the surgical site.    Using your CHG oral/dental rinse. AT YOUR PHARMACY  STEPS: use your CHG oral/dental rinse after you brush your teeth the night before (at bedtime) and the morning of your surgery. Follow the directions on your prescription label.  Use the cap on the container to measure 15ml (fill cap to fill line)  Swish (gargle if you can) the mouthwash in your mouth for at least 30 seconds, (do not swallow) spit out.  After you use your CHG rinse, do not rinse your mouth with water, drink or eat. Please refer to prescription label for the appropriate time to resume oral intake.    What side effects might I have using the CHG oral/dental rinse?  CHG rinse will stick to the plaque on the teeth. Brush and floss just before use. Teeth brushing will help avoid staining of plaque during use.  PAT DISCHARGE INSTRUCTIONS    Please call the Same Day Surgery (SDS) Department of the hospital where your procedure will be performed after 2:00 PM the day before your surgery. If you are scheduled on a Monday, or a Tuesday following a Monday holiday, you will need to call on the last business day prior to your surgery.    CALL FRIDAY    [facility]  [address] [place] [address]  [phone]    Please let your surgeon know if:      You develop any open sores, shingles, burning or painful urination as these may increase your risk of an infection.   You no longer wish to have the surgery.   Any other personal circumstances change that may lead to the need to cancel or defer this surgery-such as being sick or getting admitted to any hospital within one week of your planned procedure.    Your contact details  change, such as a change of address or phone number.    Starting now:     Please DO NOT drink alcohol or smoke for 24 hours before surgery. It is well known that quitting smoking can make a huge difference to your health and recovery from surgery. The longer you abstain from smoking, the better your chances of a healthy recovery. If you need help with quitting, call 5-968-QUIT-NOW to be connected to a trained counselor who will discuss the best methods to help you quit.     Before your surgery:    Please stop all supplements 7 days prior to surgery. Or as directed by your surgeon.   Please stop taking NSAID pain medicine such as Advil and Motrin 7 days before surgery.    If you develop any fever, cough, cold, rashes, cuts, scratches, scrapes, urinary symptoms or infection anywhere on your body (including teeth and gums) prior to surgery, please call your surgeon’s office as soon as possible. This may require treatment to reduce the chance of cancellation on the day of surgery.    The day before your surgery:   DIET- Please follow the diet instructions at the top of your packet.   Get a good night’s rest.  Use the special soap for bathing if you have been instructed to use one.    Scheduled surgery times may change and you will be notified if this occurs - please check your personal voicemail for any updates.     On the morning of surgery:   Wear comfortable, loose fitting clothes which open in the front. Please do not wear moisturizers, creams, lotions, makeup or perfume.    Please bring with you to surgery:   Photo ID and insurance card   Current list of medicines and allergies   Pacemaker/ Defibrillator/Heart stent cards   CPAP machine and mask    Slings/ splints/ crutches   A copy of your complete advanced directive/DHPOA.    Please do NOT bring with you to surgery:   All jewelry and valuables should be left at home.   Prosthetic devices such as contact lenses, hearing aids, dentures, eyelash extensions, hairpins  and body piercings must be removed prior to going in to the surgical suite.    After outpatient surgery:   A responsible adult MUST accompany you at the time of discharge and stay with you for 24 hours after your surgery. You may NOT drive yourself home after surgery.    Do not drive, operate machinery, make critical decisions or do activities that require co-ordination or balance until after a night’s sleep.   Do not drink alcoholic beverages for 24 hours.   Instructions for resuming your medications will be provided by your surgeon.    CALL YOUR DOCTOR AFTER SURGERY IF YOU HAVE:     Chills and/or a fever of 101° F or higher.    Redness, swelling, pus or drainage from your surgical wound or a bad smell from the wound.    Lightheadedness, fainting or confusion.    Persistent vomiting (throwing up) and are not able to eat or drink for 12 hours.    Three or more loose, watery bowel movements in 24 hours (diarrhea).   Difficulty or pain while urinating( after non-urological surgery)    Pain and swelling in your legs, especially if it is only on one side.    Difficulty breathing or are breathing faster than normal.    Any new concerning symptoms.    Who should I contact if I have questions about the CHG oral/dental rinse?  Please call [facility], [facility] at [phone] if you have any questions.    Who should I call if I have any questions regarding the use of CHG soap?  Call the [facility] Center for [facility] at [phone] if you have any questions.

## 2024-03-28 LAB
BACTERIA UR CULT: NORMAL
HOLD SPECIMEN: NORMAL

## 2024-03-29 ENCOUNTER — TELEPHONE (OUTPATIENT)
Dept: ORTHOPEDIC SURGERY | Facility: HOSPITAL | Age: 59
End: 2024-03-29
Payer: COMMERCIAL

## 2024-03-29 ENCOUNTER — ANESTHESIA EVENT (OUTPATIENT)
Dept: OPERATING ROOM | Facility: HOSPITAL | Age: 59
End: 2024-03-29
Payer: COMMERCIAL

## 2024-03-29 LAB — STAPHYLOCOCCUS SPEC CULT: NORMAL

## 2024-03-29 NOTE — TELEPHONE ENCOUNTER
I left a voice message on Mrs. Gavin's phone letting her know that her surgery for 4/1/24 is still happening, but due to a documentation error there is Peer to Peer that needs to be done in the morning before her surgery can take place.   Stage 15: Additional Anesthesia Type: 1% lidocaine with epinephrine

## 2024-04-01 ENCOUNTER — PREP FOR PROCEDURE (OUTPATIENT)
Dept: ORTHOPEDIC SURGERY | Facility: CLINIC | Age: 59
End: 2024-04-01

## 2024-04-01 ENCOUNTER — ANESTHESIA (OUTPATIENT)
Dept: OPERATING ROOM | Facility: HOSPITAL | Age: 59
End: 2024-04-01
Payer: COMMERCIAL

## 2024-04-01 ENCOUNTER — HOSPITAL ENCOUNTER (OUTPATIENT)
Facility: HOSPITAL | Age: 59
Setting detail: OUTPATIENT SURGERY
Discharge: HOME | End: 2024-04-01
Attending: STUDENT IN AN ORGANIZED HEALTH CARE EDUCATION/TRAINING PROGRAM | Admitting: STUDENT IN AN ORGANIZED HEALTH CARE EDUCATION/TRAINING PROGRAM
Payer: COMMERCIAL

## 2024-04-01 ENCOUNTER — APPOINTMENT (OUTPATIENT)
Dept: RADIOLOGY | Facility: HOSPITAL | Age: 59
End: 2024-04-01
Payer: COMMERCIAL

## 2024-04-01 ENCOUNTER — PHARMACY VISIT (OUTPATIENT)
Dept: PHARMACY | Facility: CLINIC | Age: 59
End: 2024-04-01
Payer: MEDICARE

## 2024-04-01 VITALS
SYSTOLIC BLOOD PRESSURE: 133 MMHG | RESPIRATION RATE: 16 BRPM | BODY MASS INDEX: 32.74 KG/M2 | TEMPERATURE: 97.5 F | HEIGHT: 64 IN | OXYGEN SATURATION: 95 % | HEART RATE: 74 BPM | WEIGHT: 191.8 LBS | DIASTOLIC BLOOD PRESSURE: 74 MMHG

## 2024-04-01 DIAGNOSIS — M17.12 ARTHRITIS OF LEFT KNEE: Primary | ICD-10-CM

## 2024-04-01 DIAGNOSIS — Z96.652 AFTERCARE FOLLOWING LEFT KNEE JOINT REPLACEMENT SURGERY: Primary | ICD-10-CM

## 2024-04-01 DIAGNOSIS — Z47.1 AFTERCARE FOLLOWING LEFT KNEE JOINT REPLACEMENT SURGERY: Primary | ICD-10-CM

## 2024-04-01 DIAGNOSIS — M17.12 PRIMARY OSTEOARTHRITIS OF LEFT KNEE: ICD-10-CM

## 2024-04-01 PROCEDURE — RXMED WILLOW AMBULATORY MEDICATION CHARGE

## 2024-04-01 PROCEDURE — 7100000002 HC RECOVERY ROOM TIME - EACH INCREMENTAL 1 MINUTE: Performed by: STUDENT IN AN ORGANIZED HEALTH CARE EDUCATION/TRAINING PROGRAM

## 2024-04-01 PROCEDURE — C1776 JOINT DEVICE (IMPLANTABLE): HCPCS | Performed by: STUDENT IN AN ORGANIZED HEALTH CARE EDUCATION/TRAINING PROGRAM

## 2024-04-01 PROCEDURE — 2500000001 HC RX 250 WO HCPCS SELF ADMINISTERED DRUGS (ALT 637 FOR MEDICARE OP): Performed by: ANESTHESIOLOGY

## 2024-04-01 PROCEDURE — 2720000007 HC OR 272 NO HCPCS: Performed by: STUDENT IN AN ORGANIZED HEALTH CARE EDUCATION/TRAINING PROGRAM

## 2024-04-01 PROCEDURE — 3600000005 HC OR TIME - INITIAL BASE CHARGE - PROCEDURE LEVEL FIVE: Performed by: STUDENT IN AN ORGANIZED HEALTH CARE EDUCATION/TRAINING PROGRAM

## 2024-04-01 PROCEDURE — 3700000001 HC GENERAL ANESTHESIA TIME - INITIAL BASE CHARGE: Performed by: STUDENT IN AN ORGANIZED HEALTH CARE EDUCATION/TRAINING PROGRAM

## 2024-04-01 PROCEDURE — 2500000004 HC RX 250 GENERAL PHARMACY W/ HCPCS (ALT 636 FOR OP/ED): Performed by: ANESTHESIOLOGY

## 2024-04-01 PROCEDURE — 73560 X-RAY EXAM OF KNEE 1 OR 2: CPT | Mod: LT

## 2024-04-01 PROCEDURE — 2500000004 HC RX 250 GENERAL PHARMACY W/ HCPCS (ALT 636 FOR OP/ED): Performed by: NURSE ANESTHETIST, CERTIFIED REGISTERED

## 2024-04-01 PROCEDURE — 73560 X-RAY EXAM OF KNEE 1 OR 2: CPT | Mod: LEFT SIDE | Performed by: RADIOLOGY

## 2024-04-01 PROCEDURE — 3600000010 HC OR TIME - EACH INCREMENTAL 1 MINUTE - PROCEDURE LEVEL FIVE: Performed by: STUDENT IN AN ORGANIZED HEALTH CARE EDUCATION/TRAINING PROGRAM

## 2024-04-01 PROCEDURE — A4649 SURGICAL SUPPLIES: HCPCS | Performed by: STUDENT IN AN ORGANIZED HEALTH CARE EDUCATION/TRAINING PROGRAM

## 2024-04-01 PROCEDURE — 2500000005 HC RX 250 GENERAL PHARMACY W/O HCPCS: Performed by: NURSE ANESTHETIST, CERTIFIED REGISTERED

## 2024-04-01 PROCEDURE — 7100000009 HC PHASE TWO TIME - INITIAL BASE CHARGE: Performed by: STUDENT IN AN ORGANIZED HEALTH CARE EDUCATION/TRAINING PROGRAM

## 2024-04-01 PROCEDURE — 94760 N-INVAS EAR/PLS OXIMETRY 1: CPT

## 2024-04-01 PROCEDURE — 3700000002 HC GENERAL ANESTHESIA TIME - EACH INCREMENTAL 1 MINUTE: Performed by: STUDENT IN AN ORGANIZED HEALTH CARE EDUCATION/TRAINING PROGRAM

## 2024-04-01 PROCEDURE — 97110 THERAPEUTIC EXERCISES: CPT | Mod: GP

## 2024-04-01 PROCEDURE — 2780000003 HC OR 278 NO HCPCS: Performed by: STUDENT IN AN ORGANIZED HEALTH CARE EDUCATION/TRAINING PROGRAM

## 2024-04-01 PROCEDURE — 97116 GAIT TRAINING THERAPY: CPT | Mod: GP

## 2024-04-01 PROCEDURE — 97161 PT EVAL LOW COMPLEX 20 MIN: CPT | Mod: GP

## 2024-04-01 PROCEDURE — 7100000001 HC RECOVERY ROOM TIME - INITIAL BASE CHARGE: Performed by: STUDENT IN AN ORGANIZED HEALTH CARE EDUCATION/TRAINING PROGRAM

## 2024-04-01 PROCEDURE — 27446 REVISION OF KNEE JOINT: CPT | Performed by: STUDENT IN AN ORGANIZED HEALTH CARE EDUCATION/TRAINING PROGRAM

## 2024-04-01 PROCEDURE — 2500000005 HC RX 250 GENERAL PHARMACY W/O HCPCS: Performed by: STUDENT IN AN ORGANIZED HEALTH CARE EDUCATION/TRAINING PROGRAM

## 2024-04-01 PROCEDURE — 7100000010 HC PHASE TWO TIME - EACH INCREMENTAL 1 MINUTE: Performed by: STUDENT IN AN ORGANIZED HEALTH CARE EDUCATION/TRAINING PROGRAM

## 2024-04-01 DEVICE — SIMPLEX® HV IS A FAST-SETTING ACRYLIC RESIN FOR USE IN BONE SURGERY. MIXING THE TWO SEPARATE STERILE COMPONENTS PRODUCES A DUCTILE BONE CEMENT WHICH, AFTER HARDENING, FIXES THE IMPLANT AND TRANSFERS STRESSES PRODUCED DURING MOVEMENT EVENLY TO THE BONE. SIMPLEX® HV CEMENT POWDER ALSO CONTAINS INSOLUBLE ZIRCONIUM DIOXIDE AS AN X-RAY CONTRAST MEDIUM. SIMPLEX® HV DOES NOT EMIT A SIGNAL AND DOES NOT POSE A SAFETY RISK IN A MAGNETIC RESONANCE ENVIRONMENT.
Type: IMPLANTABLE DEVICE | Site: KNEE | Status: FUNCTIONAL
Brand: SIMPLEX HV

## 2024-04-01 DEVICE — BONE PINS (3.2MM X 110MM): Type: IMPLANTABLE DEVICE | Site: KNEE | Status: NON-FUNCTIONAL

## 2024-04-01 RX ORDER — ALBUTEROL SULFATE 0.83 MG/ML
2.5 SOLUTION RESPIRATORY (INHALATION) ONCE AS NEEDED
Status: DISCONTINUED | OUTPATIENT
Start: 2024-04-01 | End: 2024-04-01 | Stop reason: HOSPADM

## 2024-04-01 RX ORDER — MEPERIDINE HYDROCHLORIDE 25 MG/ML
12.5 INJECTION INTRAMUSCULAR; INTRAVENOUS; SUBCUTANEOUS EVERY 10 MIN PRN
Status: DISCONTINUED | OUTPATIENT
Start: 2024-04-01 | End: 2024-04-01 | Stop reason: HOSPADM

## 2024-04-01 RX ORDER — KETOROLAC TROMETHAMINE 15 MG/ML
15 INJECTION, SOLUTION INTRAMUSCULAR; INTRAVENOUS ONCE AS NEEDED
Status: DISCONTINUED | OUTPATIENT
Start: 2024-04-01 | End: 2024-04-01 | Stop reason: HOSPADM

## 2024-04-01 RX ORDER — ONDANSETRON 4 MG/1
4 TABLET, FILM COATED ORAL EVERY 8 HOURS PRN
Qty: 20 TABLET | Refills: 0 | Status: SHIPPED | OUTPATIENT
Start: 2024-04-01

## 2024-04-01 RX ORDER — DIPHENHYDRAMINE HYDROCHLORIDE 50 MG/ML
12.5 INJECTION INTRAMUSCULAR; INTRAVENOUS ONCE AS NEEDED
Status: DISCONTINUED | OUTPATIENT
Start: 2024-04-01 | End: 2024-04-01 | Stop reason: HOSPADM

## 2024-04-01 RX ORDER — HYDRALAZINE HYDROCHLORIDE 20 MG/ML
10 INJECTION INTRAMUSCULAR; INTRAVENOUS EVERY 30 MIN PRN
Status: DISCONTINUED | OUTPATIENT
Start: 2024-04-01 | End: 2024-04-01 | Stop reason: HOSPADM

## 2024-04-01 RX ORDER — ASPIRIN 81 MG/1
81 TABLET ORAL DAILY
Qty: 14 TABLET | Refills: 0 | Status: SHIPPED | OUTPATIENT
Start: 2024-04-01 | End: 2024-04-15

## 2024-04-01 RX ORDER — DOCUSATE SODIUM 100 MG/1
100 CAPSULE, LIQUID FILLED ORAL 2 TIMES DAILY PRN
Qty: 30 CAPSULE | Refills: 0 | Status: SHIPPED | OUTPATIENT
Start: 2024-04-01

## 2024-04-01 RX ORDER — ROPIVACAINE/EPI/CLONIDINE/KET 2.46-0.005
SYRINGE (ML) INJECTION AS NEEDED
Status: DISCONTINUED | OUTPATIENT
Start: 2024-04-01 | End: 2024-04-01 | Stop reason: HOSPADM

## 2024-04-01 RX ORDER — OXYCODONE HCL 10 MG/1
10 TABLET, FILM COATED, EXTENDED RELEASE ORAL ONCE AS NEEDED
Status: DISCONTINUED | OUTPATIENT
Start: 2024-04-01 | End: 2024-04-01 | Stop reason: HOSPADM

## 2024-04-01 RX ORDER — ONDANSETRON HYDROCHLORIDE 2 MG/ML
INJECTION, SOLUTION INTRAVENOUS AS NEEDED
Status: DISCONTINUED | OUTPATIENT
Start: 2024-04-01 | End: 2024-04-01

## 2024-04-01 RX ORDER — FENTANYL CITRATE 50 UG/ML
50 INJECTION, SOLUTION INTRAMUSCULAR; INTRAVENOUS ONCE
Status: COMPLETED | OUTPATIENT
Start: 2024-04-01 | End: 2024-04-01

## 2024-04-01 RX ORDER — PHENYLEPHRINE HCL IN 0.9% NACL 1 MG/10 ML
SYRINGE (ML) INTRAVENOUS AS NEEDED
Status: DISCONTINUED | OUTPATIENT
Start: 2024-04-01 | End: 2024-04-01

## 2024-04-01 RX ORDER — CEFAZOLIN SODIUM 2 G/100ML
INJECTION, SOLUTION INTRAVENOUS AS NEEDED
Status: DISCONTINUED | OUTPATIENT
Start: 2024-04-01 | End: 2024-04-01

## 2024-04-01 RX ORDER — ALBUTEROL SULFATE 0.83 MG/ML
2.5 SOLUTION RESPIRATORY (INHALATION) ONCE
Status: DISCONTINUED | OUTPATIENT
Start: 2024-04-01 | End: 2024-04-01 | Stop reason: HOSPADM

## 2024-04-01 RX ORDER — OXYCODONE HYDROCHLORIDE 5 MG/1
5 TABLET ORAL EVERY 4 HOURS PRN
Qty: 30 TABLET | Refills: 0 | OUTPATIENT
Start: 2024-04-01 | End: 2024-04-08

## 2024-04-01 RX ORDER — MIDAZOLAM HYDROCHLORIDE 1 MG/ML
2 INJECTION, SOLUTION INTRAMUSCULAR; INTRAVENOUS ONCE
Status: COMPLETED | OUTPATIENT
Start: 2024-04-01 | End: 2024-04-01

## 2024-04-01 RX ORDER — FAMOTIDINE 20 MG/1
20 TABLET, FILM COATED ORAL ONCE
Status: COMPLETED | OUTPATIENT
Start: 2024-04-01 | End: 2024-04-01

## 2024-04-01 RX ORDER — SODIUM CHLORIDE, SODIUM LACTATE, POTASSIUM CHLORIDE, CALCIUM CHLORIDE 600; 310; 30; 20 MG/100ML; MG/100ML; MG/100ML; MG/100ML
INJECTION, SOLUTION INTRAVENOUS CONTINUOUS PRN
Status: DISCONTINUED | OUTPATIENT
Start: 2024-04-01 | End: 2024-04-01

## 2024-04-01 RX ORDER — OXYCODONE HYDROCHLORIDE 5 MG/1
5 TABLET ORAL EVERY 6 HOURS PRN
Qty: 30 TABLET | Refills: 0 | Status: SHIPPED | OUTPATIENT
Start: 2024-04-01

## 2024-04-01 RX ORDER — ASPIRIN 325 MG
325 TABLET, DELAYED RELEASE (ENTERIC COATED) ORAL DAILY
Qty: 30 TABLET | Refills: 0 | OUTPATIENT
Start: 2024-04-01 | End: 2024-05-01

## 2024-04-01 RX ORDER — METOCLOPRAMIDE 10 MG/1
10 TABLET ORAL ONCE
Status: COMPLETED | OUTPATIENT
Start: 2024-04-01 | End: 2024-04-01

## 2024-04-01 RX ORDER — TRANEXAMIC ACID 100 MG/ML
INJECTION, SOLUTION INTRAVENOUS AS NEEDED
Status: DISCONTINUED | OUTPATIENT
Start: 2024-04-01 | End: 2024-04-01

## 2024-04-01 RX ORDER — ONDANSETRON HYDROCHLORIDE 2 MG/ML
4 INJECTION, SOLUTION INTRAVENOUS ONCE AS NEEDED
Status: COMPLETED | OUTPATIENT
Start: 2024-04-01 | End: 2024-04-01

## 2024-04-01 RX ORDER — PROPOFOL 10 MG/ML
INJECTION, EMULSION INTRAVENOUS AS NEEDED
Status: DISCONTINUED | OUTPATIENT
Start: 2024-04-01 | End: 2024-04-01

## 2024-04-01 RX ORDER — KETOROLAC TROMETHAMINE 30 MG/ML
INJECTION, SOLUTION INTRAMUSCULAR; INTRAVENOUS AS NEEDED
Status: DISCONTINUED | OUTPATIENT
Start: 2024-04-01 | End: 2024-04-01

## 2024-04-01 RX ORDER — ONDANSETRON 4 MG/1
4 TABLET, FILM COATED ORAL EVERY 8 HOURS PRN
Qty: 20 TABLET | Refills: 0 | OUTPATIENT
Start: 2024-04-01 | End: 2024-04-08

## 2024-04-01 RX ORDER — DOCUSATE SODIUM 100 MG/1
100 CAPSULE, LIQUID FILLED ORAL 2 TIMES DAILY PRN
Qty: 28 CAPSULE | Refills: 0 | OUTPATIENT
Start: 2024-04-01 | End: 2024-04-15

## 2024-04-01 RX ORDER — TRANEXAMIC ACID 650 MG/1
1975 TABLET ORAL ONCE
Status: DISCONTINUED | OUTPATIENT
Start: 2024-04-01 | End: 2024-04-01

## 2024-04-01 RX ORDER — LABETALOL HYDROCHLORIDE 5 MG/ML
10 INJECTION, SOLUTION INTRAVENOUS ONCE AS NEEDED
Status: DISCONTINUED | OUTPATIENT
Start: 2024-04-01 | End: 2024-04-01 | Stop reason: HOSPADM

## 2024-04-01 RX ADMIN — HYDROMORPHONE HYDROCHLORIDE 0.5 MG: 1 INJECTION, SOLUTION INTRAMUSCULAR; INTRAVENOUS; SUBCUTANEOUS at 14:55

## 2024-04-01 RX ADMIN — SODIUM CHLORIDE, POTASSIUM CHLORIDE, SODIUM LACTATE AND CALCIUM CHLORIDE: 600; 310; 30; 20 INJECTION, SOLUTION INTRAVENOUS at 11:30

## 2024-04-01 RX ADMIN — MIDAZOLAM HYDROCHLORIDE 2 MG: 1 INJECTION, SOLUTION INTRAMUSCULAR; INTRAVENOUS at 11:06

## 2024-04-01 RX ADMIN — FENTANYL CITRATE 50 MCG: 50 INJECTION INTRAMUSCULAR; INTRAVENOUS at 11:06

## 2024-04-01 RX ADMIN — Medication 100 MCG: at 12:11

## 2024-04-01 RX ADMIN — PROPOFOL 500 MG: 10 INJECTION, EMULSION INTRAVENOUS at 11:37

## 2024-04-01 RX ADMIN — SODIUM CHLORIDE, POTASSIUM CHLORIDE, SODIUM LACTATE AND CALCIUM CHLORIDE: 600; 310; 30; 20 INJECTION, SOLUTION INTRAVENOUS at 12:35

## 2024-04-01 RX ADMIN — FAMOTIDINE 20 MG: 20 TABLET, FILM COATED ORAL at 09:13

## 2024-04-01 RX ADMIN — CEFAZOLIN SODIUM 2 G: 2 INJECTION, SOLUTION INTRAVENOUS at 11:28

## 2024-04-01 RX ADMIN — KETOROLAC TROMETHAMINE 30 MG: 30 INJECTION, SOLUTION INTRAMUSCULAR; INTRAVENOUS at 13:55

## 2024-04-01 RX ADMIN — PROPOFOL 500 MG: 10 INJECTION, EMULSION INTRAVENOUS at 12:33

## 2024-04-01 RX ADMIN — DEXAMETHASONE SODIUM PHOSPHATE 4 MG: 4 INJECTION, SOLUTION INTRAMUSCULAR; INTRAVENOUS at 11:54

## 2024-04-01 RX ADMIN — Medication 100 MCG: at 12:06

## 2024-04-01 RX ADMIN — Medication 100 MCG: at 11:54

## 2024-04-01 RX ADMIN — Medication 200 MCG: at 11:57

## 2024-04-01 RX ADMIN — ONDANSETRON 4 MG: 2 INJECTION INTRAMUSCULAR; INTRAVENOUS at 15:09

## 2024-04-01 RX ADMIN — HYDROMORPHONE HYDROCHLORIDE 0.5 MG: 1 INJECTION, SOLUTION INTRAMUSCULAR; INTRAVENOUS; SUBCUTANEOUS at 15:04

## 2024-04-01 RX ADMIN — Medication 100 MCG: at 11:51

## 2024-04-01 RX ADMIN — METOCLOPRAMIDE 10 MG: 10 TABLET ORAL at 09:13

## 2024-04-01 RX ADMIN — TRANEXAMIC ACID 1000 MG: 1 INJECTION, SOLUTION INTRAVENOUS at 14:00

## 2024-04-01 RX ADMIN — TRANEXAMIC ACID 1000 MG: 1 INJECTION, SOLUTION INTRAVENOUS at 11:42

## 2024-04-01 RX ADMIN — ONDANSETRON 4 MG: 2 INJECTION INTRAMUSCULAR; INTRAVENOUS at 13:55

## 2024-04-01 SDOH — HEALTH STABILITY: MENTAL HEALTH: CURRENT SMOKER: 0

## 2024-04-01 ASSESSMENT — PAIN SCALES - GENERAL
PAINLEVEL_OUTOF10: 6
PAINLEVEL_OUTOF10: 0 - NO PAIN
PAINLEVEL_OUTOF10: 0 - NO PAIN
PAINLEVEL_OUTOF10: 6
PAINLEVEL_OUTOF10: 0 - NO PAIN
PAINLEVEL_OUTOF10: 1
PAINLEVEL_OUTOF10: 0 - NO PAIN
PAINLEVEL_OUTOF10: 0 - NO PAIN
PAINLEVEL_OUTOF10: 5 - MODERATE PAIN
PAINLEVEL_OUTOF10: 4
PAINLEVEL_OUTOF10: 0 - NO PAIN
PAINLEVEL_OUTOF10: 3
PAIN_LEVEL: 2

## 2024-04-01 ASSESSMENT — PAIN - FUNCTIONAL ASSESSMENT
PAIN_FUNCTIONAL_ASSESSMENT: 0-10

## 2024-04-01 ASSESSMENT — COGNITIVE AND FUNCTIONAL STATUS - GENERAL: MOBILITY SCORE: 24

## 2024-04-01 ASSESSMENT — ACTIVITIES OF DAILY LIVING (ADL)
ADLS_ADDRESSED: YES
ADL_ASSISTANCE: INDEPENDENT

## 2024-04-01 ASSESSMENT — COLUMBIA-SUICIDE SEVERITY RATING SCALE - C-SSRS
6. HAVE YOU EVER DONE ANYTHING, STARTED TO DO ANYTHING, OR PREPARED TO DO ANYTHING TO END YOUR LIFE?: NO
2. HAVE YOU ACTUALLY HAD ANY THOUGHTS OF KILLING YOURSELF?: NO
6. HAVE YOU EVER DONE ANYTHING, STARTED TO DO ANYTHING, OR PREPARED TO DO ANYTHING TO END YOUR LIFE?: NO
2. HAVE YOU ACTUALLY HAD ANY THOUGHTS OF KILLING YOURSELF?: NO
1. IN THE PAST MONTH, HAVE YOU WISHED YOU WERE DEAD OR WISHED YOU COULD GO TO SLEEP AND NOT WAKE UP?: NO
1. IN THE PAST MONTH, HAVE YOU WISHED YOU WERE DEAD OR WISHED YOU COULD GO TO SLEEP AND NOT WAKE UP?: NO

## 2024-04-01 NOTE — PROGRESS NOTES
Physical Therapy    Physical Therapy Evaluation & Treatment    Patient Name: Macie Gavin  MRN: [id number]  Today's Date: 4/1/2024   Time Calculation  Start Time: 1614  Stop Time: 1656  Time Calculation (min): 42 min    Assessment/Plan   PT Assessment  PT Assessment Results: Decreased strength, Decreased range of motion, Decreased mobility, Orthopedic restrictions  Rehab Prognosis: Excellent  Evaluation/Treatment Tolerance: Patient tolerated treatment well  Strengths: Ability to acquire knowledge, Attitude of self, Capable of completing ADLs semi/independent, Insight into problems, Premorbid level of function, Rehab experience, Support of Caregivers  End of Session Communication: Bedside nurse  Assessment Comment: Pt low complexity eval presenting with minimal deficits to functional mobility following  L partial knee arthroplasty  performed on 4/1/2024. Education regarding TKA precautions provided; pt verbalized understanding. Therapeutic exercises performed; HEP handout provided. Pt with soft L knee during functional ambulation able to engage L quad following cues from PT; no buckle or LOB occurred. Pt highly motivated to return to premorbid level of function and will have support of spouse at home. PT educated pt and spouse to purchase cane on way home so pt can negotiate stairs safely with use of assistive device; pt and spouse verbalized understanding. PT recommends DC home and follow-up with OP PT.    End of Session Patient Position: On cart with BLE elevated, call light in reach, and all needs met. RN aware. Pharmacy in with pt upon PT exit.    IP OR SWING BED PT PLAN  Inpatient or Swing Bed: Inpatient  PT Plan  Treatment/Interventions: Bed mobility, Transfer training, Gait training, Stair training, Strengthening, Range of motion, Therapeutic exercise, Therapeutic activity, Home exercise program, Positioning  PT Plan: Skilled PT  PT Frequency: BID  PT Discharge Recommendations: Low intensity level of  continued care  Equipment Recommended upon Discharge: Wheeled walker, Straight cane  PT Recommended Transfer Status: Independent, Assistive device  PT - OK to Discharge: Yes      Subjective     General Visit Information:  General  Reason for Referral: L partial knee arthroplasty (2024)  Referred By: Dr. Guy  Past Medical History Relevant to Rehab: anxiety, depression, exercise-induced asthma, insomnia, OCD; cervical fusion, , hysterectomy; ETOH use  Family/Caregiver Present: Yes ( Miguel)  Prior to Session Communication: Bedside nurse  Patient Position Received: On cart  General Comment: Session cleared by RN. Pt very pleasant and agreeable to PT evaluation. Dressing to L knee dry & intact.     Home Living:  Home Living  Type of Home: House  Lives With: Spouse, Adult children  Home Adaptive Equipment: Walker rolling or standard  Home Layout: Two level, Stairs to alternate level with rails  Alternate Level Stairs-Rails: Left  Alternate Level Stairs-Number of Steps: 13  Home Access: Stairs to enter without rails  Entrance Stairs-Number of Steps: 3  Bathroom Shower/Tub: Tub/shower unit  Bathroom Equipment: Shower chair with back  Prior Level of Function:  Prior Function Per Pt/Caregiver Report  Level of [place]: Independent with ADLs and functional transfers, Independent with homemaking with ambulation  ADL Assistance: Independent  Homemaking Assistance: Independent  Ambulatory Assistance: Independent  Vocational: Full time employment ()  Prior Function Comments: pt denies falls prior to admission  Precautions:  Precautions  LE Weight Bearing Status: Weight Bearing as Tolerated  Medical Precautions: Fall precautions  Post-Surgical Precautions: Left total knee precautions    Objective   Pain:  Pain Assessment  Pain Assessment: 0-10  Pain Score: 0 - No pain  Pain Interventions: Elevated, Ambulation/increased activity  Cognition:  Cognition  Overall Cognitive Status: Within  Functional Limits  Safety/Judgement: Within Functional Limits    General Assessments:  Activity Tolerance  Endurance: Endurance does not limit participation in activity    Sensation  Light Touch: No apparent deficits  Proprioception: No apparent deficits    Coordination  Movements are Fluid and Coordinated: Yes    Postural Control  Postural Control: Within Functional Limits    Static Sitting Balance  Static Sitting-Balance Support: Bilateral upper extremity supported  Static Sitting-Level of Assistance: Independent  Dynamic Sitting Balance  Dynamic Sitting-Balance Support: Feet supported, Bilateral upper extremity supported  Dynamic Sitting-Balance: Forward lean, Reaching across midline  Dynamic Sitting-Comments: distant supervision seated EOB during LE dressing    Static Standing Balance  Static Standing-Balance Support: Bilateral upper extremity supported  Static Standing-Level of Assistance: Distant supervision  Static Standing-Comment/Number of Minutes: at rolling walker  Dynamic Standing Balance  Dynamic Standing-Balance Support: Bilateral upper extremity supported  Dynamic Standing-Comments: CGA with rolling walker  Functional Assessments:  ADL  ADL's Addressed: Yes  LE Dressing Deficit: Verbal cueing, Supervision/safety, Don/doff R sock, Don/doff L sock, Thread RLE into pants, Thread LLE into pants, Thread RLE into underwear, Thread LLE into underwear (PT provided supervision and verbal cues to dress surgical limb first; pt demonstrated understanding able to complete independently)    Bed Mobility  Bed Mobility: Yes  Bed Mobility 1  Bed Mobility 1: Supine to sitting, Sitting to supine  Level of Assistance 1: Independent    Transfers  Transfer: Yes  Transfer 1  Transfer From 1: Bed to, Stand to  Transfer to 1: Stand, Wheelchair  Technique 1: Sit to stand, Stand to sit  Transfer Device 1: Walker  Transfer Level of Assistance 1: Contact guard, Minimal verbal cues (verbal cues for hand placement)  Trials/Comments  "1: x3    Ambulation/Gait Training  Ambulation/Gait Training Performed: Yes  Ambulation/Gait Training 1  Surface 1: Level tile  Device 1: Rolling walker  Assistance 1: Contact guard  Quality of Gait 1: Soft knee(s), Antalgic, Decreased step length (soft knee without knee buckle or LOB; decreased mau; reciprocal pattern)  Comments/Distance (ft) 1: 15 feet x2    Stairs  Stairs: Yes  Stairs  Rails 1: Left  Device 1: Railing  Assistance 1: Close supervision, Minimal verbal cues (cues for sequencing of surgical limb)  Comment/Number of Steps 1: three 6\" steps; pt completed via step to pattern  Stairs 2  Rails 2: None (Comment) (R cane + L HHA)  Device 2: Single point cane  Assistance 2: Hand held assistance, Close supervision, Minimal verbal cues (cues for sequencing of surgical limb + cane)  Comment/Number of Steps 2: three 6\" steps; pt completed via step to pattern  Extremity/Trunk Assessments:  RUE   RUE : Within Functional Limits  LUE   LUE: Within Functional Limits  RLE   RLE : Within Functional Limits  LLE   LLE : Exceptions to WFL  AROM LLE (degrees)  LLE AROM Comment: knee flexion >90 degrees; full knee extension in supine lacking TKE in standing  Strength LLE  LLE Overall Strength: Greater than or equal to 3/5 as evidenced by functional mobility  Treatments:  Therapeutic Exercise  Therapeutic Exercise Performed: Yes B ankle pumps, L quad sets, L gluteal sets, L heel slides, L SAQ, L hip abduction, and L SLR x 10 reps each.    Outcome Measures:  [facility] Basic Mobility  Turning from your back to your side while in a flat bed without using bedrails: None  Moving from lying on your back to sitting on the side of a flat bed without using bedrails: None  Moving to and from bed to chair (including a wheelchair): None  Standing up from a chair using your arms (e.g. wheelchair or bedside chair): None  To walk in hospital room: None  Climbing 3-5 steps with railing: None  Basic Mobility - Total Score: 24        Education " Documentation  Handouts, taught by Estee Santos PT at 4/1/2024  6:08 PM.  Learner: Significant Other, Patient  Readiness: Eager  Method: Explanation, Demonstration, Handout  Response: Verbalizes Understanding, Demonstrated Understanding    Precautions, taught by Estee Santos PT at 4/1/2024  6:08 PM.  Learner: Significant Other, Patient  Readiness: Eager  Method: Explanation, Demonstration, Handout  Response: Verbalizes Understanding, Demonstrated Understanding    Body Mechanics, taught by Estee Santos PT at 4/1/2024  6:08 PM.  Learner: Significant Other, Patient  Readiness: Eager  Method: Explanation, Demonstration, Handout  Response: Verbalizes Understanding, Demonstrated Understanding    Home Exercise Program, taught by Estee Santos PT at 4/1/2024  6:08 PM.  Learner: Significant Other, Patient  Readiness: Eager  Method: Explanation, Demonstration, Handout  Response: Verbalizes Understanding, Demonstrated Understanding    Mobility Training, taught by Estee Santos PT at 4/1/2024  6:08 PM.  Learner: Significant Other, Patient  Readiness: Eager  Method: Explanation, Demonstration, Handout  Response: Verbalizes Understanding, Demonstrated Understanding      Estee Santos PT, DPT

## 2024-04-01 NOTE — PROGRESS NOTES
Order placed for outpatient PT. Please call location of choosing to schedule first session. Dr. Guy will be calling you 4/2/24 to discuss further. In the meantime, please complete the exercises provided by the hospital therapist 3 times a day and get up every hour that you are awake for a short walk to prevent stiffness.     Thank you,    Nikki Salter MBA, BSN, RN-BC  Orthopedic   [facility]  [phone]

## 2024-04-01 NOTE — ANESTHESIA PROCEDURE NOTES
Peripheral Block    Patient location during procedure: pre-op  Start time: 4/1/2024 11:12 AM  End time: 4/1/2024 11:15 AM  Reason for block: at surgeon's request  Staffing  Performed: attending and CRNA   Authorized by: Mane Lomax DO    Performed by: Mane Lomax DO  Preanesthetic Checklist  Completed: patient identified, IV checked, site marked, risks and benefits discussed, surgical consent, monitors and equipment checked, pre-op evaluation and timeout performed   Timeout performed at: 4/1/2024 11:05 AM  Peripheral Block  Patient position: laying flat  Prep: ChloraPrep  Patient monitoring: heart rate, cardiac monitor and continuous pulse ox  Block type: adductor canal  Laterality: left  Injection technique: single-shot  Guidance: ultrasound guided  Local infiltration: bupivicaine  Infiltration strength: 0.5 %  Dose: 25 mL  Needle  Needle type: short-bevel   Needle gauge: 22 G  Needle length: 10 cm  Needle localization: ultrasound guidance     image stored in chart  Needle insertion depth: 5 cm  Assessment  Injection assessment: negative aspiration for heme, no paresthesia on injection, incremental injection and local visualized surrounding nerve on ultrasound  Paresthesia pain: none  Heart rate change: no  Slow fractionated injection: yes

## 2024-04-01 NOTE — ANESTHESIA PREPROCEDURE EVALUATION
Patient: Macie Gavin    Procedure Information       Date/Time: 04/01/24 1030    Procedure: Arthroplasty Partial Knee ( RON Robot for L medial sided UKA ) *rapid recovery (Left: Knee) - RON Robot for L medial sided UKA    Location: [facility] OR 07 / [facility]    Surgeons: Terry Guy MD MPH            Relevant Problems   Musculoskeletal   (+) Primary osteoarthritis of left knee       Clinical information reviewed:   Tobacco  Allergies  Meds   Med Hx  Surg Hx  OB Status  Fam Hx  Soc   Hx        NPO Detail:  NPO/Void Status  Date of Last Liquid: 03/31/24  Time of Last Liquid: 2100  Date of Last Solid: 03/31/24  Time of Last Solid: 2100  Last Intake Type: Light meal  Time of Last Void: 0851         Physical Exam    Airway  Mallampati: II  TM distance: >3 FB  Neck ROM: full     Cardiovascular   Rhythm: regular  Rate: normal     Dental - normal exam     Pulmonary   Breath sounds clear to auscultation     Abdominal   Abdomen: soft             Anesthesia Plan    History of general anesthesia?: yes  History of complications of general anesthesia?: no    ASA 3     general and spinal     The patient is not a current smoker.    intravenous induction   Postoperative administration of opioids is intended.  Anesthetic plan and risks discussed with patient.    Plan discussed with CRNA, attending and CAA.

## 2024-04-01 NOTE — ANESTHESIA POSTPROCEDURE EVALUATION
Patient: Macie Gavin    Procedure Summary       Date: 04/01/24 Room / Location: [facility] / [facility]    Anesthesia Start: 1130 Anesthesia Stop: 1424    Procedure: Arthroplasty Partial Knee ( RON Robot for L medial sided UKA ) *rapid recovery (Left: Knee) Diagnosis:       Arthritis of left knee      (Arthritis of left knee [M17.12])    Surgeons: Terry Guy MD MPH Responsible Provider: Mane Lomax DO    Anesthesia Type: general, spinal ASA Status: 3            Anesthesia Type: general, spinal    Vitals Value Taken Time   /81 04/01/24 1505   Temp 36.5 °C (97.7 °F) 04/01/24 1450   Pulse 67 04/01/24 1505   Resp 20 04/01/24 1505   SpO2 97 % 04/01/24 1505       Anesthesia Post Evaluation    Patient location during evaluation: bedside  Patient participation: complete - patient participated  Level of consciousness: awake  Pain score: 2  Pain management: adequate  Airway patency: patent  Two or more strategies used to mitigate risk of obstructive sleep apnea  Cardiovascular status: acceptable  Respiratory status: acceptable  Hydration status: acceptable  Postoperative Nausea and Vomiting: none        No notable events documented.

## 2024-04-01 NOTE — BRIEF OP NOTE
Date: 2024  OR Location: [facility]    Name: Macie Gavin, : 1965, Age: 58 y.o., MRN: [id number], Sex: female    Diagnosis  Pre-op Diagnosis     * Arthritis of left knee [M17.12] Post-op Diagnosis     * Arthritis of left knee [M17.12]     Procedures  Arthroplasty Partial Knee ( RON Robot for L medial sided UKA ) *rapid recovery  38536 - [place] ARTHROPLASTY PATELLA W/O PROSTHESIS      Surgeons      * Terry Guy - Primary    Resident/Fellow/Other Assistant:  Surgeon(s) and Role:    Procedure Summary  Anesthesia: General  ASA: III  Anesthesia Staff: Anesthesiologist: Mane Lomax DO  CRNA: ERICH Ruano-CRNA  Anesthesia Break User: OLAMIDE Avila  Estimated Blood Loss: 50mL  Intra-op Medications:   Administrations occurring from 1030 to 1300 on 24:   Medication Name Total Dose   fentaNYL PF (Sublimaze) injection 50 mcg 50 mcg   midazolam (Versed) injection 2 mg 2 mg              Anesthesia Record               Intraprocedure I/O Totals          Intake    Tranexamic Acid 0.00 mL    The total shown is the total volume documented since Anesthesia Start was filed.    LR infusion 1000.00 mL    Total Intake 1000 mL          Specimen: No specimens collected     Staff:   Circulator: Janet Johnston RN; Johanna Gallagher RN  Relief Circulator: Jacqueline Rose RN  Scrub Person: Alejandra Dan PA-C; Natalie Bowens; Montse Xavier; Doron Eddy    Findings: See op report.    Complications:  None; patient tolerated the procedure well.       Disposition: PACU - hemodynamically stable.    Condition: stable    Specimens Collected: No specimens collected    Attending Attestation: I was present and scrubbed for the key portions of the procedure.    Terry Guy  Phone Number: [phone]

## 2024-04-01 NOTE — ANESTHESIA PROCEDURE NOTES
Spinal Block    Patient location during procedure: OR  Start time: 4/1/2024 11:33 AM  End time: 4/1/2024 11:35 AM  Reason for block: primary anesthetic and at surgeon's request  Staffing  Performed: CRNA   Authorized by: Mane Lomax DO    Performed by: ERICH Ruano-CRNA    Preanesthetic Checklist  Completed: patient identified, IV checked, site marked, risks and benefits discussed, surgical consent, monitors and equipment checked, pre-op evaluation, timeout performed and sterile techniques followed  Block Timeout  RN/Licensed healthcare professional reads aloud to the Anesthesia provider and entire team: Patient identity, procedure with side and site, patient position, and as applicable the availability of implants/special equipment/special requirements.  Patient on coagulant treatment: no  Timeout performed at: 4/1/2024 11:32 AM  Spinal Block  Patient position: sitting  Prep: Betadine  Sterility prep: cap, drape, gloves, hand hygiene and mask  Sedation level: light sedation  Patient monitoring: blood pressure, continuous pulse oximetry and heart rate  Approach: midline  Vertebral space: L3-4  Injection technique: single-shot  Needle  Needle type: pencil-point   Needle gauge: 25 G  Needle length: 3.5 in  Free flowing CSF: yes    Assessment  Sensory level: T6 bilateral  Block outcome: patient comfortable  Procedure assessment: patient sedated but conversant throughout procedure and patient tolerated procedure well with no immediate complications  Additional Notes  Injected  10.5 mg of spinal bupivacaine  Spinal kit lot #[id number]  Exp date 12-31-25

## 2024-04-01 NOTE — PROGRESS NOTES
04/01/24 1529   Discharge Planning   Living Arrangements Spouse/significant other   Support Systems Spouse/significant other   Assistance Needed None   Type of Residence Private residence   Who is requesting discharge planning? Provider   Home or Post Acute Services Community services   Patient expects to be discharged to: Pt will dc home with outpatient PT/OT to start next week per MD   Does the patient need discharge transport arranged? No

## 2024-04-01 NOTE — PROGRESS NOTES
Met with Patient and Care Partner at bedside- Patient is s/p Left Partial Knee Replacement with Dr. Gonzalez.  Discussion with patient included education on the following topics: [facility] Education: Wound Care, Post-Op Activity, Post-Op Precautions, Cold-Therapy, Importance of post-op prescriptions, When to call the Surgeon's Office, Use of MyChart, and When to call 9-1-1.  Patient Did Complete and Virtual Recording class prior to surgery.  Patient is able to verbalize understanding of class content/discussion.  Contact information was provided to patient for support and assistance during the post-operative period.

## 2024-04-01 NOTE — PROGRESS NOTES
Medication Education     Medication education for Macie Gavin was provided to the patient and family for the following medication(s):  Aspirin  Oxycodone  Docusate  Zofran    M2B delivered.      Medication education provided by a Pharmacist:  Dose, frequency, storage How to take and what to do if a dose is missed Proper dose, indication, possible ADRs How the medication works and benefits of taking it Benefits of taking the medication     Identified potential barriers to education:  None    Method(s) of Education:  Verbal Written materials provided and reviewed    An opportunity to ask questions and receive answers was provided.     Assessment of understanding the patient and family:  2= meets goals/outcomes    Additional Notes (if applicable):     Zion Donaldson, PharmD

## 2024-04-01 NOTE — DISCHARGE INSTRUCTIONS
Post Op Instructions: Knee/Lower Extremity  Juan Pablo Guy M.D., M.P.H.  Office Phone: [phone]  After Hours Line: [phone]      First PT Appointment:   Next week (call any  facility to set up)  First Post Op Appointment:  4/16/2024 at 10:40AM [facility] [address], [place], [place]  Please make an appointment with a physical therapist of your choice ASAP, preferably starting the day after surgery. We recommend PT 2-3x/week for 6-12 weeks after surgery.     If you have any questions, please read this information in its entirety, and then call with any questions.        MEDICATIONS PRESCRIBED FOR AFTER SURGERY:  Your preferred pharmacy on file is where your medications have been ePrescribed: Please  ASAP  FOR PAIN RELIEF (Narcotics):    You have been provided pain medication after your surgery, please take as directed. Wean off of narcotics as soon as symptoms allow. Take with food. Notify office if nausea, vomiting, headaches, or rash occurs. Other side effects include; drowsiness and constipation.    Use caution when taking TYLENOL or other acetaminophen products while taking Percocet, Norco, or Lorcet as these medications already contain acetaminophen. Do NOT exceed more than 3000mg of Tylenol per day.    We do NOT recommend ibuprofen, Advil, Aleve, Motrin, or NSAID products for 6wks after surgery, as these can delay healing.    You should not drive while taking pain medications as they delay your responses.    Narcotics are addictive and have a high abuse and overdose potential. It is extremely important to take these as directed and not to mix with alcohol or other forms of medication unless approved by the medical team. Please keep all prescribed narcotics LOCKED and away from children. Lastly, please properly dispose of leftover narcotics. Contact your local police department for more info.   o OXYCODONE 5 mg: this is a short-acting medication for pain. You should take 1 or 2 tablets  every 4 to 6 hours AS NEEDED. If you are not in pain, you should not take this medication. Try to wean down your use of this drug as soon as symptoms allow. If 2 tablets every 4 hours are not relieving all your pain please call our office or the MD on Call.     FOR NAUSEA:   o ZOFRAN (Odansteron) 4mg Tablet - this medication is for nausea or vomiting. Place 1 tablet under the tongue every 8 hours as needed for nausea. If you are not nauseous, you do not need to take this medication. Please call our office if you still experience nausea despite taking this medication.    FOR ANTI-COAGULATION (Blood Thinners):   You have been prescribed an anticoagulation medication, to be taken after surgery. This medication is taken to prevent a blood clot from developing in your leg, which is a possible complication after any surgery. This medication is required after all surgeries. You must finish the entire prescription of anticoagulation medication, no matter what type of procedure you had.   o ASPIRIN 325 mg: This is a mild blood thinner. Please take 1 tablet every day as instructed weeks: starting the day after surgery, no matter what kind of procedure you had. Finish the entire prescription bottle, even if you are feeling better.     OTHER MEDICATIONS TO CONSIDER:   o TYLENOL: You can buy this Over The Counter. Some pain medications contain Tylenol, including Norco, Lorcet, and Percocet. Oxycodone and Dilaudid DO NOT contain Tylenol, and it is recommended to add in Tylenol for additional pain control if needed. This can be taken as 325-600mg every 4 hours. The maximum dose for Tylenol per day is 3000mg.   o MIRALAX, COLACE, SENNA OR DOCUSATE: These are over-the-counter mild laxatives and stool softeners for prevention of constipation. We suggest beginning these the day after surgery if you are taking narcotics. Please call the office if you have not had a bowel movement for three days after your surgery.   o BENADRYL: Itching  can be common when taking narcotics. Take this as needed for any itching symptoms. Can be found overthe-counter.    DRESSING/BANDAGE CHANGES:   You may keep your dressings on until you are seen for post-op follow up. You can shower over your bandage beginning on post-operative day 3 (4/4/2024).      OTHER GENERAL SURGERY INFORMATION  ICE: Swelling and bruising is normal after surgery because fluids are used during surgery. Continuous icing will help to decrease swelling and pain. It is best to ice at least 5-6 times a day for 20-30 minutes. It is very important to always have a protective  between the ice and your skin. You may use ice bags, frozen wraps, frozen peas or a NICE or Game Ready unit (an ice/compression machine). If you have received a NICE machine and have any questions regarding its use, please call the number provided with the machine.    DRIVING: Please do not drive until you are evaluated in the office after surgery. You are considered an impaired  following surgery, and if you choose to drive, your insurance may not cover any accidents that occur.     PHYSICAL THERAPY: This is dependent on your injury and specific procedure. You will be given specific protocol after your surgery.    ACTIVITY RESTRICTIONS/BRACE/SLING: This is dependent on your injury and specific procedure. You may be required to use a brace or sling. The type of surgery you had will dictate how long to wear your brace/sling. Your PT protocol will outline any restrictions you may have with lifting and range of motion. If you are placed in a sling/brace, it is extremely important to use as directed and make sure you always have the sling on when ambulating (walking). It is important that you follow all instructions regarding activity restrictions as they are intended to promote healing and prevent complications. You may take the brace/sling off if you need to change the bandages, change clothes, or shower (be extra  cautious!), or if you are sitting. We recommend wearing the brace even while you are sleeping unless told otherwise y our team.     SIGNS AND SYMPTOMS OF COMPLICATIONS: Although complications are rare the following are a list of concerns you should be aware of:    Infection - increased pain not relieved with medication, fever, chills, redness, swelling or drainage from incision.    Blood Clot - swelling, tenderness, or calf pain to touch or when you move your ankle up and down, shortness of breath and chest pain.    REASONS TO CALL:   ? Fever, chills or sweats   ? Redness, swelling or warmth around the incisions, non-clear drainage from the incision or increased pain in or around the incision   ? Calf swelling, redness, pain or warmth   ? Chest pain, difficulty breathing, or cough   ? Inability to have a bowel movement after 3 days

## 2024-04-02 NOTE — SIGNIFICANT EVENT
Thank you for taking my call today regarding your recent joint replacement surgery with  Dr. Cota.    We discussed that: Your pain is Controlled on the current regimen Will fluctuate throughout recovery with increased activity You are able to tolerate regular activity and exercises The importance of continued cold therapy throughout recovery The importance of following the prescribed precautions by your surgeon You have not had a bowel movement, and we discussed the importance of a well balanced diet, hydration, and continued use of stool softener/laxative as prescribed The importance of continuing blood thinner as prescribed The importance of wearing compression stockings as prescribed Outpatient Physical Therapy is scheduled to begin:    You indicated that all of your questions have been answered at the time of our call.    Please don't hesitate to reach out if you have any additional questions or concerns.    Idalia Atkins MBA, BSN, RN-BC  SEN Jarvis, RN  [facility]  [facility] [phone]

## 2024-04-08 NOTE — OP NOTE
Arthroplasty Partial Knee ( RON Robot for L medial sided UKA ) *rapid recovery (L) Operative Note     Date: 2024  OR Location: [facility] OR    Name: Macie Gavin : 1965, Age: 58 y.o., MRN: [id number], Sex: female    Diagnosis  Pre-op Diagnosis     * Arthritis of left knee [M17.12] Post-op Diagnosis     * Arthritis of left knee [M17.12]     Procedures  00541 Arthroplasty, medial unicompartmental    Surgeons      * Terry Guy - Primary    Resident/Fellow/Other Assistant:  Surgeon(s) and Role:    Procedure Summary  Anesthesia: General  ASA: III  Anesthesia Staff: Anesthesiologist: Mane Lomax DO  CRNA: ERICH Ruano-CRNA  Anesthesia Break User: OLMAIDE Avila  Estimated Blood Loss: 50 mL  Intra-op Medications:   Administrations occurring from 1030 to 1300 on 24:   Medication Name Total Dose   fentaNYL PF (Sublimaze) injection 50 mcg 50 mcg   midazolam (Versed) injection 2 mg 2 mg              Anesthesia Record               Intraprocedure I/O Totals          Intake    Tranexamic Acid 20.00 mL    The total shown is the total volume documented since Anesthesia Start was filed.    LR infusion 2700.00 mL    ceFAZolin in dextrose (iso-os) 2 gram/100 mL 100.00 mL    Total Intake 2820 mL          Specimen: No specimens collected     Staff:   Circulator: Janet Johnston RN; Johanna Gallagher RN  Relief Circulator: Jacqueline Rose RN  Scrub Person: Alejandra Dan PA-C; Natalie Bowens; Montse Xavier; Doron Eddy         Drains and/or Catheters: * None in log *    Tourniquet Times:     Total Tourniquet Time Documented:  Thigh (Left) - 120 minutes  Total: Thigh (Left) - 120 minutes      Implants:  Implants       Type Name Action Serial No.       3.2MM X 110MM BRENDA BONE PIN  Used, Not Implanted       3.2MM X 140MM BRENDA BONE PIN Used, Not Implanted      Implant CEMENT, BONE, SIMPLEX HV FULL DOSE  40 GM - [id number] Implanted       9MM MCK TIBIAL INSERT X3 ONLAY, SIZE 3   Implanted       SIZE 3 MCK TIBIAL BASEPLATE, LM/RL Implanted       SIZE 4 MCK FEMORAL, LM-RL Implanted               Findings: See op report.    Indications: Macie Gavin is an 58 y.o. female who is having surgery for Arthritis of left knee [M17.12]. The patient is a 58 y.o. female with left sided knee pain status post a corticosteroid injection and home exercise program.  She has also seen improvement with medial sided pain with the use of a medial  brace. MRI of the L knee reveals a complex/degenerative medial meniscus tear with chondral thinning of articular surface. The lateral and patellofemoral compartments appear to be well-preserved. The ACL and PCL are intact.  I explained to the patient that based on clinical history, physical examination, and imaging she would be an appropriate candidate for a medial unicompartmental knee arthroplasty.  Risk of the procedure include but are not limited to blood loss, infection, nerve damage, persistent pain, failure of implant, need for conversion to total knee arthroplasty, blood clot, and death.  At this time, she wishes to move forward with operative intervention.    The patient was seen in the preoperative area. The risks, benefits, complications, treatment options, non-operative alternatives, expected recovery and outcomes were discussed with the patient. The possibilities of reaction to medication, pulmonary aspiration, injury to surrounding structures, bleeding, recurrent infection, the need for additional procedures, failure to diagnose a condition, and creating a complication requiring transfusion or operation were discussed with the patient. The patient concurred with the proposed plan, giving informed consent.  The site of surgery was properly noted/marked if necessary per policy. The patient has been actively warmed in preoperative area. Preoperative antibiotics have been ordered and given within 1 hours of incision. Venous thrombosis prophylaxis  have been ordered including unilateral sequential compression device    Procedure Details:   INDICATIONS: Macie Gavin is a 58 y.o. female presenting with persistent medial knee pain. Workup demonstrated medial compartment arthritis. She had failed conservative management. We discussed treatment alternatives including continued nonoperative management as well as various options for replacement including total versus partial knee replacement. Pain was limited to the medial joint and she wished to proceed with a partial replacement. We discussed the risks such as bleeding, infection, nerve damage, and stiffness, incomplete pain relief, need for reoperation and blood clot. She is agreeable with this plan, all questions were answered and she signed the surgical consent form.      FINDINGS: Upon arthrotomy, there were advanced degenerative changes of the medial compartment.     PROCEDURE NOTE: The surgical site and side were identified and marked with the patient in preop holding. The patient received preoperative antibiotic prophylaxis, brought to the operating room and positioned on the operating room table and compression points were carefully padded. Anesthesia time-out was performed. After successful induction of anesthesia, 1g of tranexamic acid was infused. A thigh tourniquet was applied and the extremity was prepped and draped. A midline incision was marked out and [place]an drape was applied. Then the extremity was elevated and the tourniquet inflated. Prior to incision, the patient and procedure were once again identified with the operating room staff.      The incision was made and full-thickness fasciocutaneous flaps were raised medially and laterally. A medial parapatellar approach was performed. The pins for the reflective markers were placed over the medial tibia in full extension, one hand-width below the tibal tubercle, and anterior femur, one hand-width proximal to the patella in 90 degrees of  flexion.     Now, utilizing the standard RON technique, the femoral condyle and tibial plateau were prepared. The trial components were placed and the appropriate insert thickness was chosen. There was appropriate medial/lateral stability. The cancellous bone was pulse lavaged. The tibial component was now cemented in place and excess cement was removed. The posterior joint space was probed with the cement scraper. Then the femoral component was cemented in place and excess cement was removed. The polyethylene insert was seated and was stable. The knee was fully extended, then flexed again and any excess cement was removed. The tourniquet was released. The cement was allowed to set in full extension. The knee was then ranged again and no excess cement was seen. The knee was once again taken through a range of motion. There was no excess cement, the component was stable and there were no mechanical symptoms such as catching. There was minimal bleeding. Pericapsular mix was injected and after repeat irrigation, the arthrotomy was closed. After repeat irrigation, the skin was closed in layers. Dry sterile dressings were applied.      The patient awoke from anesthesia without complication and was transferred to the recovery room in stable condition. All counts were correct.     Complications:  None; patient tolerated the procedure well.      Disposition: PACU - hemodynamically stable.    Condition: stable     Additional Details: None.    Attending Attestation: I was present and scrubbed for the key portions of the procedure.    Terry Guy  Phone Number: [phone]

## 2024-04-09 ENCOUNTER — APPOINTMENT (OUTPATIENT)
Dept: PHYSICAL THERAPY | Facility: CLINIC | Age: 59
End: 2024-04-09
Payer: COMMERCIAL

## 2024-04-09 ENCOUNTER — EVALUATION (OUTPATIENT)
Dept: PHYSICAL THERAPY | Facility: CLINIC | Age: 59
End: 2024-04-09
Payer: COMMERCIAL

## 2024-04-09 DIAGNOSIS — M25.562 ACUTE PAIN OF LEFT KNEE: Primary | ICD-10-CM

## 2024-04-09 DIAGNOSIS — Z96.652 AFTERCARE FOLLOWING LEFT KNEE JOINT REPLACEMENT SURGERY: ICD-10-CM

## 2024-04-09 DIAGNOSIS — Z47.1 AFTERCARE FOLLOWING LEFT KNEE JOINT REPLACEMENT SURGERY: ICD-10-CM

## 2024-04-09 PROCEDURE — 97161 PT EVAL LOW COMPLEX 20 MIN: CPT | Mod: GP | Performed by: PHYSICAL THERAPIST

## 2024-04-09 PROCEDURE — 97110 THERAPEUTIC EXERCISES: CPT | Mod: GP | Performed by: PHYSICAL THERAPIST

## 2024-04-09 PROCEDURE — 97016 VASOPNEUMATIC DEVICE THERAPY: CPT | Mod: GP | Performed by: PHYSICAL THERAPIST

## 2024-04-09 ASSESSMENT — PAIN - FUNCTIONAL ASSESSMENT: PAIN_FUNCTIONAL_ASSESSMENT: 0-10

## 2024-04-09 ASSESSMENT — PAIN SCALES - GENERAL: PAINLEVEL_OUTOF10: 7

## 2024-04-09 NOTE — PROGRESS NOTES
Physical Therapy Evaluation and Treatment      Patient Name: Macie Gavin  MRN: [id number]  Today's Date: 4/9/2024  Time Calculation  Start Time: 1055  Stop Time: 1150  Time Calculation (min): 55 min    Insurance:  Visit:  1 of 60  [facility] required: No  Payor: [facility] / Plan: [facility] / Product Type: *No Product type* /     Assessment:  PT Assessment  PT Assessment Results: Decreased strength, Decreased range of motion, Impaired balance, Decreased mobility, Pain  Rehab Prognosis: Good  Evaluation/Treatment Tolerance: Patient tolerated treatment well   Patient presents to physical therapy with c/o L knee stiffness, weakness, and discomfort s/p L medial UKA.  Exhibits decreased L knee ROM, decreased strength, impaired gait, and increased swelling consistent with referring diagnosis.    Plan:  OP PT Plan  Treatment/Interventions: Blood flow restriction therapy, Cryotherapy, Dry needling, Education/ Instruction, Electrical stimulation, Gait training, Hot pack, Manual therapy, Neuromuscular re-education, Self care/ home management, Taping techniques, Therapeutic activities, Therapeutic exercises, Vasopneumatic device  PT Plan: Skilled PT  PT Frequency: 2 times per week  Duration: 12wks  Onset Date: 04/01/24  Rehab Potential: Good  Plan of Care Agreement: Patient    Current Problem:   1. Acute pain of left knee  Follow Up In Physical Therapy      2. Aftercare following left knee joint replacement surgery  Referral to Physical Therapy    Follow Up In Physical Therapy          Subjective    Patient presents to physical therapy for evaluation of L knee.  Patient with hx of torn meniscus in August 2023.  Was in to see Dr. Bess in the fall.  Had steroid injection which was helpful but short lived.  Underwent L medial UKA on 4/1/24 by Dr. Guy.  C/o L knee pain, stiffness, and weakness.    PREVIOUS INTERVENTION:  Injection    EXACERBATING FACTORS:  Prolonged sitting  Prolonged  standing  Step to on stairs  Bending knee    RELIEVING FACTORS:  Oxicodone / Tylenol  Position change    OCCUPATION:      HOBBIES:  Walking  Playing with grandson    HOME SETUP:  Two story; live with  and son    BARRIERS IMPACTING CARE:  N/A    General:  General  Reason for Referral: L medial UKA 4/1/24  Referred By: Dr. Guy  Precautions:  Precautions  STEADI Fall Risk Score (The score of 4 or more indicates an increased risk of falling): 0  Medical Precautions: No known precautions/limitation (Medical hx reviewed, not likely to impact care.)  Pain:  Pain Assessment  Pain Assessment: 0-10  Pain Score: 7  Pain Type: Surgical pain  Pain Location: Knee  Pain Orientation: Left  Prior Level of Function:  Prior Function Per Pt/Caregiver Report  Level of [place]: Independent with ADLs and functional transfers    Objective   OBSERVATION  GAIT: diminished TKE on L in stance; decreased heel strike/toe off    AROM  R knee flexion 145  R knee extension 0    L knee flexion 70  L knee extension -8    STRENGTH  R knee flexion 5/5  R knee extension 5/5    L knee flexion 3/5  L knee extension 3-/5    R hip flexion 5/5  R hip extension 5/5  R hip ABD 5/5  R hip ER 5/5    L hip flexion 3/5  L hip extension 3/5  L hip ABD 3/5  L hip ER 3/5    Outcome Measures:  Other Measures  Lower Extremity Funtional Score (LEFS): 19/80     TREATMENT  THERAPEUTIC EXERCISE:  Access Code: [email or web address]  URL: [email or web address]  Date: 04/09/2024  Prepared by: Halina Hess    Exercises  - Supine Heel Slide  - 3 x daily - 7 x weekly - 1 sets - 10 reps  - Long Sitting Quad Set  - 3 x daily - 7 x weekly - 1 sets - 10 reps - 5 hold  - Seated Heel Slide  - 3 x daily - 7 x weekly - 1 sets - 10 reps    MODALITIES:  VND 15' L knee elevated    EDUCATION:   Patient education on diagnosis/prognosis, pathophysiology, POC, and HEP.  Patient demonstrates understanding of HEP/POC.    Goals:  1. Pain 0/10  2. Outcomes  Measure:  LEFS 60/80  3. L knee ROM 0-120 to allow patient to navigate stairs reciprocally without increased sx.  4. LLE strength 5/5 to allow patient to ambulate unlimited distances without increased sx.  5. Demonstrates independence with HEP.

## 2024-04-11 ENCOUNTER — TREATMENT (OUTPATIENT)
Dept: PHYSICAL THERAPY | Facility: CLINIC | Age: 59
End: 2024-04-11
Payer: COMMERCIAL

## 2024-04-11 DIAGNOSIS — Z96.652 AFTERCARE FOLLOWING LEFT KNEE JOINT REPLACEMENT SURGERY: ICD-10-CM

## 2024-04-11 DIAGNOSIS — M25.562 ACUTE PAIN OF LEFT KNEE: ICD-10-CM

## 2024-04-11 DIAGNOSIS — Z47.1 AFTERCARE FOLLOWING LEFT KNEE JOINT REPLACEMENT SURGERY: ICD-10-CM

## 2024-04-11 PROCEDURE — 97016 VASOPNEUMATIC DEVICE THERAPY: CPT | Mod: GP | Performed by: PHYSICAL THERAPIST

## 2024-04-11 PROCEDURE — 97110 THERAPEUTIC EXERCISES: CPT | Mod: GP | Performed by: PHYSICAL THERAPIST

## 2024-04-11 PROCEDURE — 97140 MANUAL THERAPY 1/> REGIONS: CPT | Mod: GP | Performed by: PHYSICAL THERAPIST

## 2024-04-11 ASSESSMENT — PAIN SCALES - GENERAL: PAINLEVEL_OUTOF10: 6

## 2024-04-11 ASSESSMENT — PAIN - FUNCTIONAL ASSESSMENT: PAIN_FUNCTIONAL_ASSESSMENT: 0-10

## 2024-04-11 NOTE — PROGRESS NOTES
"Physical Therapy Treatment    Patient Name: Macie Gavin  MRN: [id number]  Today's Date: 4/11/2024  Time Calculation  Start Time: 1050  Stop Time: 1150  Time Calculation (min): 60 min    Insurance:  Visit:  2 of 60  Auth required: No  Payor: [facility] / Plan: [facility] / Product Type: *No Product type* /     Assessment:   Able to increased knee flexion from 75 deg to 94 deg post MT and bike.  Progress functional strengthening and balance exercises without increased sx.  Reports relief post tx.    Plan:   Continue to progress ROM and quad strengthening.    Current Problem  1. Aftercare following left knee joint replacement surgery  Follow Up In Physical Therapy      2. Acute pain of left knee  Follow Up In Physical Therapy          General  General  Reason for Referral: L medial UKA 4/1/24  Referred By: Dr. Guy    Subjective    Was working hard on mobility yesterday.  Knee is sore today because she really worked it.    Precautions  Precautions  Medical Precautions: No known precautions/limitation (Medical hx reviewed, not likely to impact care.)  Pain  Pain Assessment  Pain Assessment: 0-10  Pain Score: 6  Pain Type: Surgical pain  Pain Location: Knee  Pain Orientation: Left    Objective   AROM  L knee 0-6-75 // 87 // 94    TREATMENT  THERAPEUTIC EXERCISE:  Recumbent bike oscillations seat 8 for 6mins  Review of heel slide with belt and QS  Assisted SLR 10x  6\" tap up 10x  6\" alternating tap up 10x ea  6\" step lunge stretch  Standing hip ABD 10x ea    MANUAL THERAPY:  Patellar glides gr III all planes  Fem AP gr III  Tib AP/PA gr III    NEUROMUSCULAR RE-EDUCATION:      THERAPEUTIC ACTIVITY:      MODALITIES:  VND L knee elevated for 15mins    OP EDUCATION:   Access Code: [email or web address]  URL: [email or web address]  Date: 04/11/2024  Prepared by: Halina Hess    Exercises  - Supine Heel Slide  - 3 x daily - 7 x weekly - 1 sets - 10 reps  - Long Sitting Quad Set "  - 3 x daily - 7 x weekly - 1 sets - 10 reps - 5 hold  - Seated Heel Slide  - 3 x daily - 7 x weekly - 1 sets - 10 reps  - Supine Heel Slide with Strap  - 3 x daily - 7 x weekly - 1 sets - 10 reps  - Supine Straight Leg Raises  - 3 x daily - 7 x weekly - 1 sets - 10 reps  - Alternating Step Taps with Counter Support  - 3 x daily - 7 x weekly - 1 sets - 10 reps  - Standing Hip Abduction with Counter Support  - 3 x daily - 7 x weekly - 1 sets - 10 reps    Goals:  1. Pain 0/10  2. Outcomes Measure:  LEFS 60/80  3. L knee ROM 0-120 to allow patient to navigate stairs reciprocally without increased sx.  4. LLE strength 5/5 to allow patient to ambulate unlimited distances without increased sx.  5. Demonstrates independence with HEP.

## 2024-04-15 ENCOUNTER — TREATMENT (OUTPATIENT)
Dept: PHYSICAL THERAPY | Facility: CLINIC | Age: 59
End: 2024-04-15
Payer: COMMERCIAL

## 2024-04-15 DIAGNOSIS — M25.562 ACUTE PAIN OF LEFT KNEE: ICD-10-CM

## 2024-04-15 DIAGNOSIS — Z47.1 AFTERCARE FOLLOWING LEFT KNEE JOINT REPLACEMENT SURGERY: ICD-10-CM

## 2024-04-15 DIAGNOSIS — Z96.652 AFTERCARE FOLLOWING LEFT KNEE JOINT REPLACEMENT SURGERY: ICD-10-CM

## 2024-04-15 PROCEDURE — 97140 MANUAL THERAPY 1/> REGIONS: CPT | Mod: CQ,GP

## 2024-04-15 PROCEDURE — 97110 THERAPEUTIC EXERCISES: CPT | Mod: GP,CQ

## 2024-04-15 ASSESSMENT — PAIN SCALES - GENERAL: PAINLEVEL_OUTOF10: 4

## 2024-04-15 ASSESSMENT — PAIN - FUNCTIONAL ASSESSMENT: PAIN_FUNCTIONAL_ASSESSMENT: 0-10

## 2024-04-15 NOTE — PROGRESS NOTES
"      Physical Therapy Treatment    Patient Name: Macie Gavin  MRN: [id number]  Today's Date: 4/15/2024  Time Calculation  Start Time: 1001  Stop Time: 1046  Time Calculation (min): 45 min  PT Therapeutic Procedures Time Entry  Manual Therapy Time Entry: 15  Therapeutic Exercise Time Entry: 30     Insurance   Visit:  3 of 60  Auth required: No  Payor: [facility] / Plan: [facility] / Product Type: *No Product type* /    Current Problem:  1. Aftercare following left knee joint replacement surgery  Follow Up In Physical Therapy       2. Acute pain of left knee  Follow Up In Physical Therapy       Subjective:  I was on my feet a lot yesterday and I feel like I have regressed. Pain is getting better.    Pain:  Pain Assessment: 0-10  Pain Score: 4    Objective:  AROM  L knee 0-8-71   90 deg post ex.    Precautions  Precautions  Medical Precautions: No known precautions/limitation    Treatments:  Therapeutic Exercise 47227: Unit(s)- 2  Recumbent bike oscillations seat 8 for 6mins   6\" alternating tap up 10x ea  6\" step lunge stretch  Standing hip ABD 10x ea  Supine Heel Slide AAROM x 10\" x 10   Supine SAQ with QS 1x10  Seated Hamstring stretch x 30sec R/L    Manual Therapy 69543: Unit(s)- 1  Supine PROM-Flexion/Extension x 15 min    Assessment:  Able to increased knee flexion from 71 deg to 90 deg post MT and bike. Progress functional strengthening and balance exercises without increased sx.    Plan:  Continue to progress ROM and quad strengthening.     The above treatment was performed by MAKAYLA Christy   "
93.6

## 2024-04-16 ENCOUNTER — OFFICE VISIT (OUTPATIENT)
Dept: ORTHOPEDIC SURGERY | Facility: CLINIC | Age: 59
End: 2024-04-16
Payer: COMMERCIAL

## 2024-04-16 ENCOUNTER — CLINICAL SUPPORT (OUTPATIENT)
Dept: ORTHOPEDIC SURGERY | Facility: CLINIC | Age: 59
End: 2024-04-16
Payer: COMMERCIAL

## 2024-04-16 DIAGNOSIS — M17.12 PRIMARY OSTEOARTHRITIS OF LEFT KNEE: ICD-10-CM

## 2024-04-16 DIAGNOSIS — M23.204 OLD TEAR OF MEDIAL MENISCUS OF LEFT KNEE, UNSPECIFIED TEAR TYPE: ICD-10-CM

## 2024-04-16 DIAGNOSIS — M25.562 ARTHRALGIA OF LEFT KNEE: ICD-10-CM

## 2024-04-16 PROCEDURE — 99024 POSTOP FOLLOW-UP VISIT: CPT | Performed by: STUDENT IN AN ORGANIZED HEALTH CARE EDUCATION/TRAINING PROGRAM

## 2024-04-16 ASSESSMENT — PAIN DESCRIPTION - DESCRIPTORS: DESCRIPTORS: DULL;THROBBING

## 2024-04-16 ASSESSMENT — PAIN - FUNCTIONAL ASSESSMENT: PAIN_FUNCTIONAL_ASSESSMENT: 0-10

## 2024-04-16 ASSESSMENT — PAIN SCALES - GENERAL: PAINLEVEL_OUTOF10: 3

## 2024-04-16 NOTE — PROGRESS NOTES
PRIMARY CARE PHYSICIAN: Rocky Logan MD    ORTHOPAEDIC POSTOPERATIVE VISIT    ASSESSMENT & PLAN    Impression: 58 y.o. female 2 weeks postop s/p left knee medial sided unicompartmental knee arthroplasty.  Patient is feeling well.  She does have some anterior knee pain along the incision.  The medial sided knee pain is since improved.  She continues with her aspirin for DVT prophylaxis as instructed.  She is attending physical therapy with her protocol as instructed.    Plan:   The patient will continue physical therapy as instructed.  She continue aspirin for DVT prophylaxis for an additional 2 weeks.    I reviewed the intraoperative findings with the patient and answered all their questions. I reviewed their postoperative timeline and plan with them. They understand the postoperative precautions and the treatment plan going forward.     Follow-Up: Patient will follow-up in 4 weeks.    At the end of the visit, all questions were answered in full. The patient is in agreement with the plan and recommendations. They will call the office with any questions/concerns.    Note dictated with [employer] software. Completed without full typed error editing and sent to avoid delay.    SUBJECTIVE  CHIEF COMPLAINT:   Chief Complaint   Patient presents with    Left Knee - Post-op     Sx 4/1/24        HPI: Mcaie Gavin is a 58 y.o. patient. Macie Gavin is now 2 weeks and 1 day postop status post left knee medial sided unicompartmental knee arthroplasty.  Overall, the patient is improving.  She has pain secondary to the procedure, however this is improving daily.  No issues with her incision.  She is taking her clot prevention medication as prescribed.  There are no reports of distal numbness or tingling.  She is attending her physical therapy protocol/sessions as prescribed.    REVIEW OF SYSTEMS  Constitutional: See HPI for pain assessment, No significant weight loss, recent trauma  Cardiovascular: No  chest pain, shortness of breath  Respiratory: No difficulty breathing, cough  Gastrointestinal: No nausea, vomiting, diarrhea, constipation  Musculoskeletal: Noted in HPI, positive for pain, restricted motion, stiffness  Integumentary: No rashes, easy bruising, redness   Neurological: no numbness or tingling in extremities, no gait disturbances   Psychiatric: No mood changes, memory changes, social issues  Heme/Lymph: no excessive swelling, easy bruising, excessive bleeding  ENT: No hearing changes  Eyes: No vision changes    CURRENT MEDICATIONS:   Current Outpatient Medications   Medication Sig Dispense Refill    albuterol 90 mcg/actuation inhaler Inhale 2 puffs every 6 hours if needed.      budesonide-formoteroL (Symbicort) 160-4.5 mcg/actuation inhaler Inhale 2 puffs once daily as needed (ASTHMA EXERCISE INDUCED).      docusate sodium (Colace) 100 mg capsule Take 1 capsule (100 mg) by mouth 2 times a day as needed for constipation. 30 capsule 0    ergocalciferol (Vitamin D-2) 1.25 MG (23311 UT) capsule Take 1 capsule (1,250 mcg) by mouth 1 (one) time per week. MONDAY      estradiol (Estrace) 0.01 % (0.1 mg/gram) vaginal cream Insert 1 g into the vagina.  1-2 times per week as directed.      ibuprofen 800 mg tablet Take 1 tablet (800 mg) by mouth every 8 hours if needed for mild pain (1 - 3).      meloxicam (Mobic) 15 mg tablet Take 1 tablet (15 mg) by mouth once daily.      ondansetron (Zofran) 4 mg tablet Take 1 tablet (4 mg) by mouth every 8 hours if needed for nausea or vomiting. 20 tablet 0    oxyCODONE (Roxicodone) 5 mg immediate release tablet Take 1 tablet (5 mg) by mouth every 6 hours if needed for severe pain (7 - 10). 30 tablet 0    sertraline (Zoloft) 100 mg tablet Take 1 tablet (100 mg) by mouth once daily.       No current facility-administered medications for this visit.        OBJECTIVE    PHYSICAL EXAM      4/1/2024     2:35 PM 4/1/2024     2:50 PM 4/1/2024     3:05 PM 4/1/2024     3:20 PM  4/1/2024     3:35 PM 4/1/2024     3:57 PM 4/1/2024     5:00 PM   Vitals   Systolic 131 144 127 141 137  133   Diastolic 74 81 81 78 75  74   Heart Rate 65 61 67 63 70 65 74   Temp  36.5 °C (97.7 °F)  36.5 °C (97.7 °F)   36.4 °C (97.5 °F)   Resp 16 18 20 18 16 18 16      There is no height or weight on file to calculate BMI.    General: Well-appearing, no acute distress    Skin intact bilateral upper and lower extremities  No erythema  No warmth    Detailed examination of left knee demonstrates:  Surgical incisions healing well, Steri-Strips in place  No erythema or warmth  No drainage  No ecchymosis  Moderate effusion  Resolving swelling, minimal  Knee range of motion: 3-90 secondary to her effusion.  Patella mobility 1+ medial and lateral  Lower extremity motor grossly intact  L4-S1 sensation intact bilaterally  2+ DP/PT pulses bilaterally  Warm and well-perfused, brisk capillary refill    IMAGING:   AP and lateral views of left knee reveal well-placed medial sided unicompartmental knee arthroplasty.  There is no evidence of fracture or component loosening.    Juan Pablo Guy MD, MPH  Attending Surgeon    Sports Medicine Orthopaedic Surgery  [facility] [place]  [facility] [employer]

## 2024-04-17 ENCOUNTER — TREATMENT (OUTPATIENT)
Dept: PHYSICAL THERAPY | Facility: CLINIC | Age: 59
End: 2024-04-17
Payer: COMMERCIAL

## 2024-04-17 DIAGNOSIS — M25.562 ACUTE PAIN OF LEFT KNEE: ICD-10-CM

## 2024-04-17 DIAGNOSIS — Z96.652 AFTERCARE FOLLOWING LEFT KNEE JOINT REPLACEMENT SURGERY: ICD-10-CM

## 2024-04-17 DIAGNOSIS — Z47.1 AFTERCARE FOLLOWING LEFT KNEE JOINT REPLACEMENT SURGERY: ICD-10-CM

## 2024-04-17 PROCEDURE — 97110 THERAPEUTIC EXERCISES: CPT | Mod: GP | Performed by: PHYSICAL THERAPIST

## 2024-04-17 PROCEDURE — 97140 MANUAL THERAPY 1/> REGIONS: CPT | Mod: GP | Performed by: PHYSICAL THERAPIST

## 2024-04-17 PROCEDURE — 97016 VASOPNEUMATIC DEVICE THERAPY: CPT | Mod: GP | Performed by: PHYSICAL THERAPIST

## 2024-04-17 ASSESSMENT — PAIN SCALES - GENERAL: PAINLEVEL_OUTOF10: 2

## 2024-04-17 ASSESSMENT — PAIN - FUNCTIONAL ASSESSMENT: PAIN_FUNCTIONAL_ASSESSMENT: 0-10

## 2024-04-17 NOTE — PROGRESS NOTES
"Physical Therapy Treatment    Patient Name: Macie Gavin  MRN: [id number]  Today's Date: 4/17/2024  Time Calculation  Start Time: 0800  Stop Time: 0902  Time Calculation (min): 62 min    Insurance:  Visit:  4 of 60  Auth required: No  Payor: [facility] / Plan: [facility] / Product Type: *No Product type* /     Assessment:  Demonstrates increased ROM at start of session.  Able to progress functional strengthening with step ups and leg press.  Added step ups, calf stretch, heel raises, and SLS with 3-way tap to HEP.    Plan:   Continue to progress ROM and quad strengthening.    Current Problem  1. Aftercare following left knee joint replacement surgery  Follow Up In Physical Therapy      2. Acute pain of left knee  Follow Up In Physical Therapy        General  General  Reason for Referral: L medial UKA 4/1/24  Referred By: Dr. Guy    Subjective    Knee is feeling pretty good today.  Saw surgeon yesterday to have dressing removed.  Pleased with progress.    Precautions  Precautions  Medical Precautions: No known precautions/limitation  Pain  Pain Assessment  Pain Assessment: 0-10  Pain Score: 2  Pain Location: Knee  Pain Orientation: Left    Objective   AROM  L knee 0-6-90 // 95 // 97    TREATMENT  THERAPEUTIC EXERCISE:  Recumbent bike oscillations seat 9 for 6mins  TG L4 SDR 5 B press 10x2  Gastroc step off step 20\"x2  Heel raises off step 10x2  6\" step up 10x  SLS with 3-way tap to fatigue  Fwd step and hold on foam 10x    MANUAL THERAPY:  Patellar glides gr III all planes  Fem AP gr III  Tib AP/PA gr III    NEUROMUSCULAR RE-EDUCATION:      THERAPEUTIC ACTIVITY:      MODALITIES:  VND L knee elevated for 15mins    OP EDUCATION:   Access Code: [email or web address]  URL: [email or web address]  Date: 04/11/2024  Prepared by: Halina Hess    Exercises  - Supine Heel Slide  - 3 x daily - 7 x weekly - 1 sets - 10 reps  - Long Sitting Quad Set  - 3 x daily - 7 x weekly - 1 " sets - 10 reps - 5 hold  - Seated Heel Slide  - 3 x daily - 7 x weekly - 1 sets - 10 reps  - Supine Heel Slide with Strap  - 3 x daily - 7 x weekly - 1 sets - 10 reps  - Supine Straight Leg Raises  - 3 x daily - 7 x weekly - 1 sets - 10 reps  - Alternating Step Taps with Counter Support  - 3 x daily - 7 x weekly - 1 sets - 10 reps  - Standing Hip Abduction with Counter Support  - 3 x daily - 7 x weekly - 1 sets - 10 reps    Goals:  1. Pain 0/10  2. Outcomes Measure:  LEFS 60/80  3. L knee ROM 0-120 to allow patient to navigate stairs reciprocally without increased sx.  4. LLE strength 5/5 to allow patient to ambulate unlimited distances without increased sx.  5. Demonstrates independence with HEP.

## 2024-04-18 ENCOUNTER — APPOINTMENT (OUTPATIENT)
Dept: ORTHOPEDIC SURGERY | Facility: CLINIC | Age: 59
End: 2024-04-18
Payer: COMMERCIAL

## 2024-04-23 ENCOUNTER — APPOINTMENT (OUTPATIENT)
Dept: PHYSICAL THERAPY | Facility: CLINIC | Age: 59
End: 2024-04-23
Payer: COMMERCIAL

## 2024-04-24 ENCOUNTER — TREATMENT (OUTPATIENT)
Dept: PHYSICAL THERAPY | Facility: CLINIC | Age: 59
End: 2024-04-24
Payer: COMMERCIAL

## 2024-04-24 DIAGNOSIS — Z96.652 AFTERCARE FOLLOWING LEFT KNEE JOINT REPLACEMENT SURGERY: ICD-10-CM

## 2024-04-24 DIAGNOSIS — Z47.1 AFTERCARE FOLLOWING LEFT KNEE JOINT REPLACEMENT SURGERY: ICD-10-CM

## 2024-04-24 DIAGNOSIS — M25.562 ACUTE PAIN OF LEFT KNEE: ICD-10-CM

## 2024-04-24 PROCEDURE — 97016 VASOPNEUMATIC DEVICE THERAPY: CPT | Mod: GP,CQ

## 2024-04-24 PROCEDURE — 97140 MANUAL THERAPY 1/> REGIONS: CPT | Mod: CQ,GP

## 2024-04-24 PROCEDURE — 97110 THERAPEUTIC EXERCISES: CPT | Mod: GP,CQ

## 2024-04-24 ASSESSMENT — PAIN SCALES - GENERAL: PAINLEVEL_OUTOF10: 2

## 2024-04-24 ASSESSMENT — PAIN - FUNCTIONAL ASSESSMENT: PAIN_FUNCTIONAL_ASSESSMENT: 0-10

## 2024-04-24 NOTE — PROGRESS NOTES
"      Physical Therapy Treatment    Patient Name: Macie Gavin  MRN: [id number]  Today's Date: 4/24/2024  Time Calculation  Start Time: 1000  Stop Time: 1107  Time Calculation (min): 67 min  PT Therapeutic Procedures Time Entry  Manual Therapy Time Entry: 22  Therapeutic Exercise Time Entry: 30  PT Modalities Time Entry  Vasopneumatic Devices Time Entry: 15    Insurance   Visit:  5 of 60  Auth required: No  Payor: [facility] / Plan: [facility] / Product Type: *No Product type* /     Current Problem:  1. Aftercare following left knee joint replacement surgery  Follow Up In Physical Therapy       2. Acute pain of left knee  Follow Up In Physical Therapy     Subjective:  This morning my pain was really bad, maybe a 6. My leg kept having muscle spasms all night and I can't seem to know why. I was on my feet na lot yesterday.     Pain:  Pain Assessment: 0-10  Pain Score: 2    Objective:  AROM  L knee 0-6-90    Precautions  Precautions  Medical Precautions: No known precautions/limitation    Treatments:  Therapeutic Exercise 95122: Unit(s)- 2  Recumbent bike oscillations seat 8 for 6mins 3F/3B  Seated Hamstring stretch x 30sec x 2 R/L  6\" step lunge stretch x 30 x 3 L   TG Level 4 SDR 5 B leg press- 2x10  Anterior step up/down 6\" block- 1x10 R/L  Lateral step up/down 2\" block- 1x15 R/L  SLS with 3-way tap- 1x10 R/L  Fwd step and hold on foam 10 x 3 sec hold R/L  Supine SLR with QS 1x10    Manual Therapy 24951: Unit(s)- 1  Supine PROM-Flexion/Extension w/ distraction  Patellar glides gr III all planes  Fem AP gr III    Modalities: 1  VND L knee elevated for 15mins     Assessment:  Pt. Was able to tolerate exercises well this date with no increase in sx. Pt. Was challenged this date with knee extension exercises.    Plan:  Continue to progress ROM and quad strengthening.     The above treatment was performed by Jeremie Amaya S-SHAE   "

## 2024-04-26 ENCOUNTER — TREATMENT (OUTPATIENT)
Dept: PHYSICAL THERAPY | Facility: CLINIC | Age: 59
End: 2024-04-26
Payer: COMMERCIAL

## 2024-04-26 DIAGNOSIS — Z47.1 AFTERCARE FOLLOWING LEFT KNEE JOINT REPLACEMENT SURGERY: ICD-10-CM

## 2024-04-26 DIAGNOSIS — Z96.652 AFTERCARE FOLLOWING LEFT KNEE JOINT REPLACEMENT SURGERY: ICD-10-CM

## 2024-04-26 DIAGNOSIS — M25.562 ACUTE PAIN OF LEFT KNEE: ICD-10-CM

## 2024-04-26 PROCEDURE — 97110 THERAPEUTIC EXERCISES: CPT | Mod: GP,CQ

## 2024-04-26 PROCEDURE — 97140 MANUAL THERAPY 1/> REGIONS: CPT | Mod: CQ,GP

## 2024-04-26 ASSESSMENT — PAIN - FUNCTIONAL ASSESSMENT: PAIN_FUNCTIONAL_ASSESSMENT: 0-10

## 2024-04-26 ASSESSMENT — PAIN SCALES - GENERAL: PAINLEVEL_OUTOF10: 0 - NO PAIN

## 2024-04-26 NOTE — PROGRESS NOTES
"      Physical Therapy Treatment    Patient Name: Macie Gavin  MRN: [id number]  Today's Date: 4/26/2024  Time Calculation  Start Time: 1046  Stop Time: 1126  Time Calculation (min): 40 min  PT Therapeutic Procedures Time Entry  Manual Therapy Time Entry: 15  Therapeutic Exercise Time Entry: 25     Insurance   Visit:  6 of 60  Auth required: No  Payor: [facility] / Plan: [facility] / Product Type: *No Product type* /     Current Problem:  1. Aftercare following left knee joint replacement surgery  Follow Up In Physical Therapy       2. Acute pain of left knee  Follow Up In Physical Therapy       Subjective:  I feel good today. I feel like my pain is down and the burning has gone away a lot.    Pain:  Pain Assessment: 0-10  Pain Score: 0 - No pain    Objective:  AROM  L knee 0-    Precautions  Precautions  Medical Precautions: No known precautions/limitation    Treatments:  Therapeutic Exercise 13676: Unit(s)- 2  Recumbent bike oscillations seat 7 for 6mins 3F/3B  6\" step lunge stretch x 30 x 3 L   TG Level 5 SDR 5 B leg press- 2x10  Anterior step up/down 8\" block- 1x10 R/L  Lateral step up/down 2\" block- 1x15 R/L  Fwd step and hold on foam 10 x 3 sec hold R/L  Supine SLR with QS 1x10 x5 second eccentric    Manual Therapy 64832: Unit(s)- 1  Supine PROM-Flexion/Extension w/ distraction  Patellar glides gr III all planes  Fem AP gr III    Assessment:  Pt. Was able to tolerate exercises well this date with no increase in sx. Pt. Was challenged this date with knee extension exercises.     Plan:  Continue to progress ROM and quad strengthening.      The above treatment was performed by MAKAYLA Christy  "

## 2024-04-30 ENCOUNTER — TREATMENT (OUTPATIENT)
Dept: PHYSICAL THERAPY | Facility: CLINIC | Age: 59
End: 2024-04-30
Payer: COMMERCIAL

## 2024-04-30 DIAGNOSIS — Z96.652 AFTERCARE FOLLOWING LEFT KNEE JOINT REPLACEMENT SURGERY: ICD-10-CM

## 2024-04-30 DIAGNOSIS — Z47.1 AFTERCARE FOLLOWING LEFT KNEE JOINT REPLACEMENT SURGERY: ICD-10-CM

## 2024-04-30 DIAGNOSIS — M25.562 ACUTE PAIN OF LEFT KNEE: ICD-10-CM

## 2024-04-30 PROCEDURE — 97016 VASOPNEUMATIC DEVICE THERAPY: CPT | Mod: GP | Performed by: PHYSICAL THERAPIST

## 2024-04-30 PROCEDURE — 97110 THERAPEUTIC EXERCISES: CPT | Mod: GP | Performed by: PHYSICAL THERAPIST

## 2024-04-30 ASSESSMENT — PAIN - FUNCTIONAL ASSESSMENT: PAIN_FUNCTIONAL_ASSESSMENT: 0-10

## 2024-04-30 ASSESSMENT — PAIN SCALES - GENERAL: PAINLEVEL_OUTOF10: 1

## 2024-04-30 NOTE — PROGRESS NOTES
"Physical Therapy Treatment    Patient Name: Macie Gavin  MRN: [id number]  Today's Date: 4/30/2024  Time Calculation  Start Time: 1000  Stop Time: 1100  Time Calculation (min): 60 min    Insurance:  Visit:  7 of 60 Auth required: No  Payor: [facility] / Plan: [facility] / Product Type: *No Product type* /     Assessment:  Focused on TKE and quad strengthening.  Demonstrates increased knee flexion at start of session.    Plan:  Continue to address extn ROM.    Current Problem  1. Aftercare following left knee joint replacement surgery  Follow Up In Physical Therapy      2. Acute pain of left knee  Follow Up In Physical Therapy        General  General  Reason for Referral: L medial UKA 4/1/24  Referred By: Dr. Guy    Subjective    Had a lot of swelling yesterday to the point where she was starting to get concerned about a blood clot.  Reassured patient it was likely do to humidity and heat, swelling down today.    Precautions  Precautions  Medical Precautions: No known precautions/limitation  Pain  Pain Assessment  Pain Assessment: 0-10  Pain Score: 1  Pain Location: Knee  Pain Orientation: Left    Objective   AROM  L knee 0-7-112    4wks post op (Mon)    TREATMENT  THERAPEUTIC EXERCISE:  Recumbent bike seat 7 random level 1 for 6mins  Seated extn stretch with heel propped and belt OP 20\"x5  TKE with Blue TB 5\" x10  TG L5 SDR 5 B press 10x  TG L3 SDR 5 SL press 10x2 ea  RTB side stepping 2 laps    MANUAL THERAPY:      NEUROMUSCULAR RE-EDUCATION:      THERAPEUTIC ACTIVITY:      MODALITIES:  VND L knee elevated for 15mins    OP EDUCATION:   Access Code: [email or web address]  URL: [email or web address]  Date: 04/30/2024  Prepared by: Halina Hess    Exercises  - Supine Heel Slide  - 3 x daily - 7 x weekly - 1 sets - 10 reps  - Long Sitting Quad Set  - 3 x daily - 7 x weekly - 1 sets - 10 reps - 5 hold  - Seated Heel Slide  - 3 x daily - 7 x weekly - 1 sets - 10 reps  - " Supine Heel Slide with Strap  - 3 x daily - 7 x weekly - 1 sets - 10 reps  - Supine Straight Leg Raises  - 3 x daily - 7 x weekly - 1 sets - 10 reps  - Alternating Step Taps with Counter Support  - 3 x daily - 7 x weekly - 1 sets - 10 reps  - Standing Hip Abduction with Counter Support  - 3 x daily - 7 x weekly - 1 sets - 10 reps  - Gastroc Stretch on Step  - 2 x daily - 7 x weekly - 1 sets - 3 reps - 20 hold  - Standing Bilateral Heel Raise on Step  - 2 x daily - 7 x weekly - 1 sets - 10 reps  - Step Up  - 2 x daily - 7 x weekly - 1 sets - 10 reps  - Single Leg Balance with Clock Reach  - 2 x daily - 7 x weekly - 1 sets - 10 reps  - Seated Knee Extension Stretch with Chair  - 2 x daily - 7 x weekly - 1 sets - 5 reps - 20 hold  - Standing Terminal Knee Extension with Resistance  - 2 x daily - 7 x weekly - 1 sets - 10 reps - 5 hold  - Side Stepping with Resistance at Feet  - 2 x daily - 7 x weekly - 1 sets - 10 reps    Goals:  1. Pain 0/10  2. Outcomes Measure:  LEFS 60/80  3. L knee ROM 0-120 to allow patient to navigate stairs reciprocally without increased sx.  4. LLE strength 5/5 to allow patient to ambulate unlimited distances without increased sx.  5. Demonstrates independence with HEP.

## 2024-05-03 ENCOUNTER — TREATMENT (OUTPATIENT)
Dept: PHYSICAL THERAPY | Facility: CLINIC | Age: 59
End: 2024-05-03
Payer: COMMERCIAL

## 2024-05-03 DIAGNOSIS — M25.562 ACUTE PAIN OF LEFT KNEE: ICD-10-CM

## 2024-05-03 DIAGNOSIS — Z47.1 AFTERCARE FOLLOWING LEFT KNEE JOINT REPLACEMENT SURGERY: ICD-10-CM

## 2024-05-03 DIAGNOSIS — Z96.652 AFTERCARE FOLLOWING LEFT KNEE JOINT REPLACEMENT SURGERY: ICD-10-CM

## 2024-05-03 PROCEDURE — 97110 THERAPEUTIC EXERCISES: CPT | Mod: GP,CQ

## 2024-05-03 PROCEDURE — 97140 MANUAL THERAPY 1/> REGIONS: CPT | Mod: CQ,GP

## 2024-05-03 ASSESSMENT — PAIN - FUNCTIONAL ASSESSMENT: PAIN_FUNCTIONAL_ASSESSMENT: 0-10

## 2024-05-03 ASSESSMENT — PAIN SCALES - GENERAL: PAINLEVEL_OUTOF10: 0 - NO PAIN

## 2024-05-03 NOTE — PROGRESS NOTES
"      Physical Therapy Treatment    Patient Name: Macie Gavin  MRN: [id number]  Today's Date: 5/3/2024  Time Calculation  Start Time: 1045  Stop Time: 1132  Time Calculation (min): 47 min  PT Therapeutic Procedures Time Entry  Manual Therapy Time Entry: 15  Therapeutic Exercise Time Entry: 32     Insurance   Visit:  8 of 60  Auth required: No  Payor: [facility] / Plan: [facility] / Product Type: *No Product type* /     Current Problem:  1. Aftercare following left knee joint replacement surgery  Follow Up In Physical Therapy       2. Acute pain of left knee  Follow Up In Physical Therapy       Subjective:  My swelling has gone down, but I am still super tight above my knee.    Pain:  Pain Assessment: 0-10  Pain Score: 0 - No pain    Objective:  AROM  0- L    Precautions  Precautions  Medical Precautions: No known precautions/limitation    Treatments:  Therapeutic Exercise 16381: Unit(s)- 2  Recumbent bike seat 8 for 6 mins 3F/3B manual level 2  6\" step lunge stretch x 30 x 3 L   TG Level 6 SDR 5 B leg press- 3x10 w/ HS stretch x 30 sec  Anterior step up/down 8\" block- 1x10 R/L  Lateral step up/down 2\" block- 1x15 R/L  Fwd step and hold on foam 10 x 3 sec hold R/L  Supine SLR with QS 1x10 x5 second eccentric    Manual Therapy 85919: Unit(s)- 1  Seated PROM-Flexion/Extension w/ distraction    Assessment:  Focused on TKE and quad strengthening. Patient was able to increase flexion by the end of the session.     Plan:  Continue to address extn ROM.    The above treatment was performed by Jeremie LARA"

## 2024-05-07 ENCOUNTER — TREATMENT (OUTPATIENT)
Dept: PHYSICAL THERAPY | Facility: CLINIC | Age: 59
End: 2024-05-07
Payer: COMMERCIAL

## 2024-05-07 DIAGNOSIS — Z47.1 AFTERCARE FOLLOWING LEFT KNEE JOINT REPLACEMENT SURGERY: ICD-10-CM

## 2024-05-07 DIAGNOSIS — Z96.652 AFTERCARE FOLLOWING LEFT KNEE JOINT REPLACEMENT SURGERY: ICD-10-CM

## 2024-05-07 DIAGNOSIS — M25.562 ACUTE PAIN OF LEFT KNEE: ICD-10-CM

## 2024-05-07 PROCEDURE — 97140 MANUAL THERAPY 1/> REGIONS: CPT | Mod: GP | Performed by: PHYSICAL THERAPIST

## 2024-05-07 PROCEDURE — 97016 VASOPNEUMATIC DEVICE THERAPY: CPT | Mod: GP | Performed by: PHYSICAL THERAPIST

## 2024-05-07 PROCEDURE — 97110 THERAPEUTIC EXERCISES: CPT | Mod: GP | Performed by: PHYSICAL THERAPIST

## 2024-05-07 ASSESSMENT — PAIN - FUNCTIONAL ASSESSMENT: PAIN_FUNCTIONAL_ASSESSMENT: 0-10

## 2024-05-07 ASSESSMENT — PAIN SCALES - GENERAL: PAINLEVEL_OUTOF10: 0 - NO PAIN

## 2024-05-07 NOTE — PROGRESS NOTES
"Physical Therapy Treatment    Patient Name: Macie Gavin  MRN: [id number]  Today's Date: 5/7/2024  Time Calculation  Start Time: 1331  Stop Time: 1430  Time Calculation (min): 59 min    Insurance:  Visit:  10 of 60  Auth required: No  Payor: [facility] / Plan: [facility] / Product Type: *No Product type* /     Assessment:  Able to increase knee extn to -4 deg post MT.    Plan:  Progress functional strengthening.    Current Problem  1. Aftercare following left knee joint replacement surgery  Follow Up In Physical Therapy      2. Acute pain of left knee  Follow Up In Physical Therapy          General  General  Reason for Referral: L medial UKA 4/1/24  Referred By: Dr. Guy    Subjective    Tried prolonged extension stretch with overpressure on Sunday and said her knee was really sore after.      Precautions  Precautions  Medical Precautions: No known precautions/limitation  Pain  Pain Assessment  Pain Assessment: 0-10  Pain Score: 0 - No pain  Pain Location: Knee  Pain Orientation: Left    Objective   AROM  L knee 0-7-120    5wks post op (Mon)    TREATMENT  THERAPEUTIC EXERCISE:  Recumbent bike seat 7 random level 2 for 6mins  TG L3 SDR 5 SL press 10x2 ea  4\" tap down 10x2  RTB side stepping 2 laps  Retro walking with RTB at bar 2 laps  Modified SL DL with 5# KB 10x  TRX squats 10x    MANUAL THERAPY:  MFR to quad tendon with TT    NEUROMUSCULAR RE-EDUCATION:      THERAPEUTIC ACTIVITY:      MODALITIES:  VND L knee elevated for 15mins    OP EDUCATION:   Access Code: [email or web address]  URL: [email or web address]  Date: 04/30/2024  Prepared by: Halina Hess    Exercises  - Supine Heel Slide  - 3 x daily - 7 x weekly - 1 sets - 10 reps  - Long Sitting Quad Set  - 3 x daily - 7 x weekly - 1 sets - 10 reps - 5 hold  - Seated Heel Slide  - 3 x daily - 7 x weekly - 1 sets - 10 reps  - Supine Heel Slide with Strap  - 3 x daily - 7 x weekly - 1 sets - 10 reps  - Supine " Straight Leg Raises  - 3 x daily - 7 x weekly - 1 sets - 10 reps  - Alternating Step Taps with Counter Support  - 3 x daily - 7 x weekly - 1 sets - 10 reps  - Standing Hip Abduction with Counter Support  - 3 x daily - 7 x weekly - 1 sets - 10 reps  - Gastroc Stretch on Step  - 2 x daily - 7 x weekly - 1 sets - 3 reps - 20 hold  - Standing Bilateral Heel Raise on Step  - 2 x daily - 7 x weekly - 1 sets - 10 reps  - Step Up  - 2 x daily - 7 x weekly - 1 sets - 10 reps  - Single Leg Balance with Clock Reach  - 2 x daily - 7 x weekly - 1 sets - 10 reps  - Seated Knee Extension Stretch with Chair  - 2 x daily - 7 x weekly - 1 sets - 5 reps - 20 hold  - Standing Terminal Knee Extension with Resistance  - 2 x daily - 7 x weekly - 1 sets - 10 reps - 5 hold  - Side Stepping with Resistance at Feet  - 2 x daily - 7 x weekly - 1 sets - 10 reps    Goals:  1. Pain 0/10  2. Outcomes Measure:  LEFS 60/80  3. L knee ROM 0-120 to allow patient to navigate stairs reciprocally without increased sx.  4. LLE strength 5/5 to allow patient to ambulate unlimited distances without increased sx.  5. Demonstrates independence with HEP.

## 2024-05-09 ENCOUNTER — TREATMENT (OUTPATIENT)
Dept: PHYSICAL THERAPY | Facility: CLINIC | Age: 59
End: 2024-05-09
Payer: COMMERCIAL

## 2024-05-09 DIAGNOSIS — Z96.652 AFTERCARE FOLLOWING LEFT KNEE JOINT REPLACEMENT SURGERY: ICD-10-CM

## 2024-05-09 DIAGNOSIS — Z47.1 AFTERCARE FOLLOWING LEFT KNEE JOINT REPLACEMENT SURGERY: ICD-10-CM

## 2024-05-09 DIAGNOSIS — M25.562 ACUTE PAIN OF LEFT KNEE: ICD-10-CM

## 2024-05-09 PROCEDURE — 97140 MANUAL THERAPY 1/> REGIONS: CPT | Mod: GP | Performed by: PHYSICAL THERAPIST

## 2024-05-09 PROCEDURE — 97110 THERAPEUTIC EXERCISES: CPT | Mod: GP | Performed by: PHYSICAL THERAPIST

## 2024-05-09 ASSESSMENT — PAIN SCALES - GENERAL: PAINLEVEL_OUTOF10: 0 - NO PAIN

## 2024-05-09 ASSESSMENT — PAIN - FUNCTIONAL ASSESSMENT: PAIN_FUNCTIONAL_ASSESSMENT: 0-10

## 2024-05-09 NOTE — PROGRESS NOTES
"Physical Therapy Treatment    Patient Name: Macie Gavin  MRN: [id number]  Today's Date: 5/9/2024  Time Calculation  Start Time: 1600  Stop Time: 1644  Time Calculation (min): 44 min    Insurance:  Visit:  12 of 60 Auth required: No  Payor: [facility] / Plan: [facility] / Product Type: *No Product type* /     Assessment:  Improved extn at start of visit.  Initiated bridges, clamshells, and ABD for HEP for further hip strengthening.    Plan:  Progress functional strengthening.    Current Problem  1. Aftercare following left knee joint replacement surgery  Follow Up In Physical Therapy      2. Acute pain of left knee  Follow Up In Physical Therapy            General  General  Reason for Referral: L medial UKA 4/1/24  Referred By: Dr. Guy    Subjective    STM to quad tendon has been helpful.  Feels improvement in extn.    Precautions  Precautions  Medical Precautions: No known precautions/limitation  Pain  Pain Assessment  Pain Assessment: 0-10  Pain Score: 0 - No pain  Pain Location: Knee  Pain Orientation: Left    Objective   AROM  L knee 0-5-125    5wks post op (Mon)    TREATMENT  THERAPEUTIC EXERCISE:  Recumbent bike seat 7 random level 2 for 6mins  Blue TB TKE 5\" x10  TG L3 + 10# SDR 5 SL press 10x2 ea  4\" tap down 10x2  SL chop with 4# med ball 10x2 ea  Bridges 10x  SL ER 10x   SL ABD 10x    MANUAL THERAPY:  MFR to quad tendon with TT  Fem AP gr III    NEUROMUSCULAR RE-EDUCATION:      THERAPEUTIC ACTIVITY:      MODALITIES:      OP EDUCATION:   Access Code: [email or web address]  URL: [email or web address]  Date: 05/09/2024  Prepared by: Halina Hess    Exercises  - Supine Heel Slide  - 3 x daily - 7 x weekly - 1 sets - 10 reps  - Long Sitting Quad Set  - 3 x daily - 7 x weekly - 1 sets - 10 reps - 5 hold  - Seated Heel Slide  - 3 x daily - 7 x weekly - 1 sets - 10 reps  - Supine Heel Slide with Strap  - 3 x daily - 7 x weekly - 1 sets - 10 reps  - Supine " Straight Leg Raises  - 3 x daily - 7 x weekly - 1 sets - 10 reps  - Alternating Step Taps with Counter Support  - 3 x daily - 7 x weekly - 1 sets - 10 reps  - Standing Hip Abduction with Counter Support  - 3 x daily - 7 x weekly - 1 sets - 10 reps  - Gastroc Stretch on Step  - 2 x daily - 7 x weekly - 1 sets - 3 reps - 20 hold  - Standing Bilateral Heel Raise on Step  - 2 x daily - 7 x weekly - 1 sets - 10 reps  - Step Up  - 2 x daily - 7 x weekly - 1 sets - 10 reps  - Single Leg Balance with Clock Reach  - 2 x daily - 7 x weekly - 1 sets - 10 reps  - Seated Knee Extension Stretch with Chair  - 2 x daily - 7 x weekly - 1 sets - 5 reps - 20 hold  - Standing Terminal Knee Extension with Resistance  - 2 x daily - 7 x weekly - 1 sets - 10 reps - 5 hold  - Side Stepping with Resistance at Feet  - 2 x daily - 7 x weekly - 1 sets - 10 reps  - Supine Bridge  - 2 x daily - 7 x weekly - 1 sets - 10 reps  - Clamshell  - 2 x daily - 7 x weekly - 1 sets - 10 reps  - Sidelying Hip Abduction  - 2 x daily - 7 x weekly - 1 sets - 10 reps    Goals:  1. Pain 0/10  2. Outcomes Measure:  LEFS 60/80  3. L knee ROM 0-120 to allow patient to navigate stairs reciprocally without increased sx.  4. LLE strength 5/5 to allow patient to ambulate unlimited distances without increased sx.  5. Demonstrates independence with HEP.

## 2024-05-14 ENCOUNTER — APPOINTMENT (OUTPATIENT)
Dept: ORTHOPEDIC SURGERY | Facility: CLINIC | Age: 59
End: 2024-05-14
Payer: COMMERCIAL

## 2024-05-14 ENCOUNTER — OFFICE VISIT (OUTPATIENT)
Dept: ORTHOPEDIC SURGERY | Facility: CLINIC | Age: 59
End: 2024-05-14
Payer: COMMERCIAL

## 2024-05-14 DIAGNOSIS — Z96.652 STATUS POST TOTAL LEFT KNEE REPLACEMENT: Primary | ICD-10-CM

## 2024-05-14 PROCEDURE — 99024 POSTOP FOLLOW-UP VISIT: CPT | Performed by: STUDENT IN AN ORGANIZED HEALTH CARE EDUCATION/TRAINING PROGRAM

## 2024-05-14 ASSESSMENT — PAIN DESCRIPTION - DESCRIPTORS: DESCRIPTORS: ACHING

## 2024-05-14 ASSESSMENT — PAIN - FUNCTIONAL ASSESSMENT: PAIN_FUNCTIONAL_ASSESSMENT: 0-10

## 2024-05-14 ASSESSMENT — PAIN SCALES - GENERAL: PAINLEVEL_OUTOF10: 2

## 2024-05-21 ENCOUNTER — TREATMENT (OUTPATIENT)
Dept: PHYSICAL THERAPY | Facility: CLINIC | Age: 59
End: 2024-05-21
Payer: COMMERCIAL

## 2024-05-21 DIAGNOSIS — Z96.652 AFTERCARE FOLLOWING LEFT KNEE JOINT REPLACEMENT SURGERY: ICD-10-CM

## 2024-05-21 DIAGNOSIS — M25.562 ACUTE PAIN OF LEFT KNEE: ICD-10-CM

## 2024-05-21 DIAGNOSIS — M17.12 ARTHRITIS OF LEFT KNEE: Primary | ICD-10-CM

## 2024-05-21 DIAGNOSIS — Z47.1 AFTERCARE FOLLOWING LEFT KNEE JOINT REPLACEMENT SURGERY: ICD-10-CM

## 2024-05-21 PROCEDURE — 97110 THERAPEUTIC EXERCISES: CPT | Mod: GP,CQ

## 2024-05-21 PROCEDURE — 97140 MANUAL THERAPY 1/> REGIONS: CPT | Mod: CQ,GP

## 2024-05-21 NOTE — PROGRESS NOTES
"      Physical Therapy Treatment    Patient Name: Macie Gavin  MRN: [id number]  Today's Date: 5/21/2024           Insurance   Visit:  13 of 60  Auth required: No  Payor: [facility] / Plan: [facility] / Product Type: *No Product type* /     Current Problem:  1. Aftercare following left knee joint replacement surgery  Follow Up In Physical Therapy       2. Acute pain of left knee  Follow Up In Physical Therapy       Subjective:  I was on my feet a lot this week end working in the yard.  My knee did pretty good.  I saw the surgeon and he wants me to work more on getting my knee straight    Pain:       Objective:  AROM L knee  0-    Precautions  Precautions  Medical Precautions: No known precautions/limitation    Treatments:  Therapeutic Exercise 36964: Unit(s)- 2  Recumbent bike seat 8 for 6 mins 3F/3B manual level 2  6\" step lunge stretch x 30 x 3 L   TG Level 6 SDR 5 B leg press- 3x10 w/ HS stretch x 30 sec  Anterior step up/down 8\" block- 1x10 R/L  Lateral step up/down 2\" block- 1x15 R/L  Fwd step and hold on foam 10 x 3 sec hold R/L  Supine LAQ with QS 1x10 x5 second eccentric  Manual Therapy 36857: Unit(s)- 1  Seated PROM-Flexion/Extension w/ distraction  X15 mn  Assessment:  Focused on TKE and quad strengthening.   Fatigue and discomfort noted at end of range.  Plan:  Continue to focus on quad function and extension of knee.  "

## 2024-05-22 NOTE — PROGRESS NOTES
PRIMARY CARE PHYSICIAN: Rocky Logan MD    ORTHOPAEDIC POSTOPERATIVE VISIT    ASSESSMENT & PLAN    Impression: 58 y.o. female 6 weeks postop s/p left knee medial unicompartmental knee arthroplasty.  Overall, the patient is doing well.  Her pain is significantly improved.  She continues to work on achieving full motion with physical therapy.    Plan:   We went over the patient's physical therapy protocol again today.  She will work on continuing to regain her full knee extension.  All questions were answered.    I reviewed the intraoperative findings with the patient and answered all their questions. I reviewed their postoperative timeline and plan with them. They understand the postoperative precautions and the treatment plan going forward.     Follow-Up: Patient will follow-up in 6 weeks (12 weeks postoperatively).    At the end of the visit, all questions were answered in full. The patient is in agreement with the plan and recommendations. They will call the office with any questions/concerns.    Note dictated with [employer] software. Completed without full typed error editing and sent to avoid delay.    SUBJECTIVE  CHIEF COMPLAINT:   Chief Complaint   Patient presents with    Left Knee - Follow-up     Sx 4/1/24 states knee is doing good.         HPI: Macie Gavin is a 58 y.o. patient. Macie Gavin is now 6 weeks postop status post left knee medial unicompartmental knee arthroplasty.  Overall, the patient is happy with her progress.  Her swelling is significantly improved.  There are no reported issues with her surgical incision.  The patient has begun physical therapy and is working on range of motion.  She denies any distal numbness or tingling.  She has completed her course of aspirin for DVT prophylaxis as prescribed.    REVIEW OF SYSTEMS  Constitutional: See HPI for pain assessment, No significant weight loss, recent trauma  Cardiovascular: No chest pain, shortness of  breath  Respiratory: No difficulty breathing, cough  Gastrointestinal: No nausea, vomiting, diarrhea, constipation  Musculoskeletal: Noted in HPI, positive for pain, restricted motion, stiffness  Integumentary: No rashes, easy bruising, redness   Neurological: no numbness or tingling in extremities, no gait disturbances   Psychiatric: No mood changes, memory changes, social issues  Heme/Lymph: no excessive swelling, easy bruising, excessive bleeding  ENT: No hearing changes  Eyes: No vision changes    CURRENT MEDICATIONS:   Current Outpatient Medications   Medication Sig Dispense Refill    albuterol 90 mcg/actuation inhaler Inhale 2 puffs every 6 hours if needed.      budesonide-formoteroL (Symbicort) 160-4.5 mcg/actuation inhaler Inhale 2 puffs once daily as needed (ASTHMA EXERCISE INDUCED).      docusate sodium (Colace) 100 mg capsule Take 1 capsule (100 mg) by mouth 2 times a day as needed for constipation. 30 capsule 0    ergocalciferol (Vitamin D-2) 1.25 MG (11594 UT) capsule Take 1 capsule (1,250 mcg) by mouth 1 (one) time per week. MONDAY      estradiol (Estrace) 0.01 % (0.1 mg/gram) vaginal cream Insert 1 g into the vagina.  1-2 times per week as directed.      ibuprofen 800 mg tablet Take 1 tablet (800 mg) by mouth every 8 hours if needed for mild pain (1 - 3).      meloxicam (Mobic) 15 mg tablet Take 1 tablet (15 mg) by mouth once daily.      ondansetron (Zofran) 4 mg tablet Take 1 tablet (4 mg) by mouth every 8 hours if needed for nausea or vomiting. 20 tablet 0    oxyCODONE (Roxicodone) 5 mg immediate release tablet Take 1 tablet (5 mg) by mouth every 6 hours if needed for severe pain (7 - 10). 30 tablet 0    sertraline (Zoloft) 100 mg tablet Take 1 tablet (100 mg) by mouth once daily.       No current facility-administered medications for this visit.        OBJECTIVE    PHYSICAL EXAM      4/1/2024     2:35 PM 4/1/2024     2:50 PM 4/1/2024     3:05 PM 4/1/2024     3:20 PM 4/1/2024     3:35 PM 4/1/2024      3:57 PM 4/1/2024     5:00 PM   Vitals   Systolic 131 144 127 141 137  133   Diastolic 74 81 81 78 75  74   Heart Rate 65 61 67 63 70 65 74   Temp  36.5 °C (97.7 °F)  36.5 °C (97.7 °F)   36.4 °C (97.5 °F)   Resp 16 18 20 18 16 18 16      There is no height or weight on file to calculate BMI.    General: Well-appearing, no acute distress    Skin intact bilateral upper and lower extremities  No erythema  No warmth    Detailed examination of left knee demonstrates:  Surgical incisions healing well.  No erythema or warmth  No drainage  No ecchymosis  Mild effusion  Resolving swelling, minimal  Knee range of motion: 5-110 degrees.  Patella mobility 1+ medial and lateral  Lower extremity motor grossly intact  L4-S1 sensation intact bilaterally  2+ DP/PT pulses bilaterally  Warm and well-perfused, brisk capillary refill    IMAGING:   AP and lateral x-rays of the left knee reveal well-placed cemented medial sided unicompartmental knee arthroplasty.  No evidence of interval fracture, displacement of the implants, or loosening.    Juan Pablo Guy MD, MPH  Attending Surgeon    Sports Medicine Orthopaedic Surgery  [facility] [place]  [employer]

## 2024-05-28 ENCOUNTER — TREATMENT (OUTPATIENT)
Dept: PHYSICAL THERAPY | Facility: CLINIC | Age: 59
End: 2024-05-28
Payer: COMMERCIAL

## 2024-05-28 DIAGNOSIS — M17.12 PRIMARY OSTEOARTHRITIS OF LEFT KNEE: Primary | ICD-10-CM

## 2024-05-28 DIAGNOSIS — M25.562 ACUTE PAIN OF LEFT KNEE: ICD-10-CM

## 2024-05-28 DIAGNOSIS — Z47.1 AFTERCARE FOLLOWING LEFT KNEE JOINT REPLACEMENT SURGERY: ICD-10-CM

## 2024-05-28 DIAGNOSIS — Z96.652 AFTERCARE FOLLOWING LEFT KNEE JOINT REPLACEMENT SURGERY: ICD-10-CM

## 2024-05-28 PROCEDURE — 97110 THERAPEUTIC EXERCISES: CPT | Mod: GP,CQ

## 2024-05-28 PROCEDURE — 97140 MANUAL THERAPY 1/> REGIONS: CPT | Mod: CQ,GP

## 2024-05-28 ASSESSMENT — PAIN SCALES - GENERAL: PAINLEVEL_OUTOF10: 0 - NO PAIN

## 2024-05-28 ASSESSMENT — PAIN - FUNCTIONAL ASSESSMENT: PAIN_FUNCTIONAL_ASSESSMENT: 0-10

## 2024-05-28 NOTE — PROGRESS NOTES
"      Physical Therapy Treatment    Patient Name: Macie Gavin  MRN: [id number]  Today's Date: 5/28/2024           Insurance   Visit:  13 of 60 Auth required: No  Payor: [facility] / Plan: [facility] / Product Type: *No Product type* /     Current Problem:  1. Aftercare following left knee joint replacement surgery  Follow Up In Physical Therapy       2. Acute pain of left knee  Follow Up In Physical Therapy       Subjective:  I started back to work today.  My  job requires me to sit, I do get up every hour to move around.    Pain:  Pain Assessment: 0-10  Pain Score: 0 - No pain    Objective:  AROM L knee  1--pre  0-5-130    Precautions  Precautions  Medical Precautions: No known precautions/limitation    Treatments:  Therapeutic Exercise 97858: Unit(s)-1  Seated Quad set with hands for feedback L LE x 15x6\" hold  Supine SAQ with QS 1x15x6\" hold  Supine LAQ with QS 1x10 x5 second eccentric  SLR with cuing and assistance to obtain good quality quad contraction  Manual Therapy 16755: Unit(s)- 2  Ponderosa tail to L Quad, Hamstring, Gastroc   Gr II Patellar glides 2x10  Inferior superior and med/lat  Static stretch into extension with OP x 30sec x3  Seated PROM-Flexion/Extension w/ distraction x10min  X15 mn  Assessment:   Plan: Improved ability to facilitate and able to demonstrate quality quad contraction with cuing from hands and from verbal instructions.   Continue to focus on quad function and extension of knee.  Patient will be seen 2x week .  "

## 2024-05-30 ENCOUNTER — TREATMENT (OUTPATIENT)
Dept: PHYSICAL THERAPY | Facility: CLINIC | Age: 59
End: 2024-05-30
Payer: COMMERCIAL

## 2024-05-30 DIAGNOSIS — M17.12 ARTHRITIS OF LEFT KNEE: Primary | ICD-10-CM

## 2024-05-30 DIAGNOSIS — Z96.652 AFTERCARE FOLLOWING LEFT KNEE JOINT REPLACEMENT SURGERY: ICD-10-CM

## 2024-05-30 DIAGNOSIS — M25.562 ACUTE PAIN OF LEFT KNEE: ICD-10-CM

## 2024-05-30 DIAGNOSIS — Z47.1 AFTERCARE FOLLOWING LEFT KNEE JOINT REPLACEMENT SURGERY: ICD-10-CM

## 2024-05-30 PROCEDURE — 97110 THERAPEUTIC EXERCISES: CPT | Mod: GP,CQ

## 2024-05-30 PROCEDURE — 97140 MANUAL THERAPY 1/> REGIONS: CPT | Mod: CQ,GP

## 2024-05-30 ASSESSMENT — PAIN SCALES - GENERAL: PAINLEVEL_OUTOF10: 0 - NO PAIN

## 2024-05-30 ASSESSMENT — PAIN - FUNCTIONAL ASSESSMENT: PAIN_FUNCTIONAL_ASSESSMENT: 0-10

## 2024-06-07 ENCOUNTER — TREATMENT (OUTPATIENT)
Dept: PHYSICAL THERAPY | Facility: CLINIC | Age: 59
End: 2024-06-07
Payer: COMMERCIAL

## 2024-06-07 DIAGNOSIS — M25.562 ACUTE PAIN OF LEFT KNEE: ICD-10-CM

## 2024-06-07 DIAGNOSIS — Z47.1 AFTERCARE FOLLOWING LEFT KNEE JOINT REPLACEMENT SURGERY: ICD-10-CM

## 2024-06-07 DIAGNOSIS — Z96.652 AFTERCARE FOLLOWING LEFT KNEE JOINT REPLACEMENT SURGERY: ICD-10-CM

## 2024-06-07 PROCEDURE — 97110 THERAPEUTIC EXERCISES: CPT | Mod: GP | Performed by: PHYSICAL THERAPIST

## 2024-06-07 PROCEDURE — 97140 MANUAL THERAPY 1/> REGIONS: CPT | Mod: GP | Performed by: PHYSICAL THERAPIST

## 2024-06-07 ASSESSMENT — PAIN - FUNCTIONAL ASSESSMENT: PAIN_FUNCTIONAL_ASSESSMENT: 0-10

## 2024-06-07 ASSESSMENT — PAIN SCALES - GENERAL: PAINLEVEL_OUTOF10: 0 - NO PAIN

## 2024-06-07 NOTE — PROGRESS NOTES
Physical Therapy Treatment    Patient Name: Macie Gavin  MRN: [id number]  Today's Date: 6/7/2024  Time Calculation  Start Time: 1533  Stop Time: 1620  Time Calculation (min): 47 min    Insurance:  Visit:  16 of 60  Auth required: No  Payor: [facility] / Plan: [facility] / Product Type: *No Product type* /     Assessment:  Able to get to 0deg extn post MT and BFR.  Reports fatigue with BFR.    Plan:  Continue to work on TKE and quad strengthening.    Current Problem  1. Aftercare following left knee joint replacement surgery  Follow Up In Physical Therapy      2. Acute pain of left knee  Follow Up In Physical Therapy          General  General  Reason for Referral: L medial UKA 4/1/24  Referred By: Dr. Guy    Subjective    Feels like the new exercises the last two weeks have been helpful in improving ROM and gait.    Precautions  Precautions  Medical Precautions: No known precautions/limitation  Pain  Pain Assessment  Pain Assessment: 0-10  Pain Score: 0 - No pain  Pain Location: Knee  Pain Orientation: Left    Objective   AROM  L knee 0-4-144    9wks post op (Mon)    TREATMENT  THERAPEUTIC EXERCISE:  Recumbent bike seat 7 random level 2 for 6mins  BFR: QS, SLR    MANUAL THERAPY:  Patellar glides all planes gr III  Fem AP gr III+  Tib PA gr III+    NEUROMUSCULAR RE-EDUCATION:      THERAPEUTIC ACTIVITY:      MODALITIES:      OP EDUCATION:   Access Code: [email or web address]  URL: [email or web address]  Date: 05/09/2024  Prepared by: Halina Hess    Exercises  - Supine Heel Slide  - 3 x daily - 7 x weekly - 1 sets - 10 reps  - Long Sitting Quad Set  - 3 x daily - 7 x weekly - 1 sets - 10 reps - 5 hold  - Seated Heel Slide  - 3 x daily - 7 x weekly - 1 sets - 10 reps  - Supine Heel Slide with Strap  - 3 x daily - 7 x weekly - 1 sets - 10 reps  - Supine Straight Leg Raises  - 3 x daily - 7 x weekly - 1 sets - 10 reps  - Alternating Step Taps with Counter Support  - 3 x  daily - 7 x weekly - 1 sets - 10 reps  - Standing Hip Abduction with Counter Support  - 3 x daily - 7 x weekly - 1 sets - 10 reps  - Gastroc Stretch on Step  - 2 x daily - 7 x weekly - 1 sets - 3 reps - 20 hold  - Standing Bilateral Heel Raise on Step  - 2 x daily - 7 x weekly - 1 sets - 10 reps  - Step Up  - 2 x daily - 7 x weekly - 1 sets - 10 reps  - Single Leg Balance with Clock Reach  - 2 x daily - 7 x weekly - 1 sets - 10 reps  - Seated Knee Extension Stretch with Chair  - 2 x daily - 7 x weekly - 1 sets - 5 reps - 20 hold  - Standing Terminal Knee Extension with Resistance  - 2 x daily - 7 x weekly - 1 sets - 10 reps - 5 hold  - Side Stepping with Resistance at Feet  - 2 x daily - 7 x weekly - 1 sets - 10 reps  - Supine Bridge  - 2 x daily - 7 x weekly - 1 sets - 10 reps  - Clamshell  - 2 x daily - 7 x weekly - 1 sets - 10 reps  - Sidelying Hip Abduction  - 2 x daily - 7 x weekly - 1 sets - 10 reps    Goals:  1. Pain 0/10  2. Outcomes Measure:  LEFS 60/80  3. L knee ROM 0-120 to allow patient to navigate stairs reciprocally without increased sx.  4. LLE strength 5/5 to allow patient to ambulate unlimited distances without increased sx.  5. Demonstrates independence with HEP.

## 2024-06-14 ENCOUNTER — TREATMENT (OUTPATIENT)
Dept: PHYSICAL THERAPY | Facility: CLINIC | Age: 59
End: 2024-06-14
Payer: COMMERCIAL

## 2024-06-14 DIAGNOSIS — Z96.652 AFTERCARE FOLLOWING LEFT KNEE JOINT REPLACEMENT SURGERY: ICD-10-CM

## 2024-06-14 DIAGNOSIS — M25.562 ACUTE PAIN OF LEFT KNEE: ICD-10-CM

## 2024-06-14 DIAGNOSIS — Z47.1 AFTERCARE FOLLOWING LEFT KNEE JOINT REPLACEMENT SURGERY: ICD-10-CM

## 2024-06-14 PROCEDURE — 97110 THERAPEUTIC EXERCISES: CPT | Mod: GP | Performed by: PHYSICAL THERAPIST

## 2024-06-14 ASSESSMENT — PAIN SCALES - GENERAL: PAINLEVEL_OUTOF10: 0 - NO PAIN

## 2024-06-14 ASSESSMENT — PAIN - FUNCTIONAL ASSESSMENT: PAIN_FUNCTIONAL_ASSESSMENT: 0-10

## 2024-06-14 NOTE — PROGRESS NOTES
"Physical Therapy Treatment    Patient Name: Macie Gavin  MRN: [id number]  Today's Date: 6/14/2024  Time Calculation  Start Time: 1543  Stop Time: 1626  Time Calculation (min): 43 min    Insurance:  Visit:  17 of 60  Auth required: No  Payor: [facility] / Plan: [facility] / Product Type: *No Product type* /     Assessment:  Demonstrates 0deg extn post stim.      Plan:  Continue to work on TKE and quad strengthening.    Current Problem  1. Aftercare following left knee joint replacement surgery  Follow Up In Physical Therapy      2. Acute pain of left knee  Follow Up In Physical Therapy            General  General  Reason for Referral: L medial UKA 4/1/24  Referred By: Dr. Guy    Subjective    Feeling a little stiffer today bc she has been at her daughter's all week and has been off her normal routine.    Precautions  Precautions  Medical Precautions: No known precautions/limitation  Pain  Pain Assessment  Pain Assessment: 0-10  Pain Score: 0 - No pain    Objective   AROM  L knee 0-1-144    10wks post op (Mon)    TREATMENT  THERAPEUTIC EXERCISE:  Recumbent bike seat 7 random level 2 for 6mins  QS with estim to 37mA for 8mins  TG L4 + 20# SL press 10x2  4\" tap down 10x2    MANUAL THERAPY:      NEUROMUSCULAR RE-EDUCATION:      THERAPEUTIC ACTIVITY:      MODALITIES:      OP EDUCATION:   Access Code: [email or web address]  URL: [email or web address]  Date: 05/09/2024  Prepared by: Halina Hess    Exercises  - Supine Heel Slide  - 3 x daily - 7 x weekly - 1 sets - 10 reps  - Long Sitting Quad Set  - 3 x daily - 7 x weekly - 1 sets - 10 reps - 5 hold  - Seated Heel Slide  - 3 x daily - 7 x weekly - 1 sets - 10 reps  - Supine Heel Slide with Strap  - 3 x daily - 7 x weekly - 1 sets - 10 reps  - Supine Straight Leg Raises  - 3 x daily - 7 x weekly - 1 sets - 10 reps  - Alternating Step Taps with Counter Support  - 3 x daily - 7 x weekly - 1 sets - 10 reps  - Standing Hip " Abduction with Counter Support  - 3 x daily - 7 x weekly - 1 sets - 10 reps  - Gastroc Stretch on Step  - 2 x daily - 7 x weekly - 1 sets - 3 reps - 20 hold  - Standing Bilateral Heel Raise on Step  - 2 x daily - 7 x weekly - 1 sets - 10 reps  - Step Up  - 2 x daily - 7 x weekly - 1 sets - 10 reps  - Single Leg Balance with Clock Reach  - 2 x daily - 7 x weekly - 1 sets - 10 reps  - Seated Knee Extension Stretch with Chair  - 2 x daily - 7 x weekly - 1 sets - 5 reps - 20 hold  - Standing Terminal Knee Extension with Resistance  - 2 x daily - 7 x weekly - 1 sets - 10 reps - 5 hold  - Side Stepping with Resistance at Feet  - 2 x daily - 7 x weekly - 1 sets - 10 reps  - Supine Bridge  - 2 x daily - 7 x weekly - 1 sets - 10 reps  - Clamshell  - 2 x daily - 7 x weekly - 1 sets - 10 reps  - Sidelying Hip Abduction  - 2 x daily - 7 x weekly - 1 sets - 10 reps    Goals:  1. Pain 0/10  2. Outcomes Measure:  LEFS 60/80  3. L knee ROM 0-120 to allow patient to navigate stairs reciprocally without increased sx.  4. LLE strength 5/5 to allow patient to ambulate unlimited distances without increased sx.  5. Demonstrates independence with HEP.

## 2024-06-21 ENCOUNTER — APPOINTMENT (OUTPATIENT)
Dept: PHYSICAL THERAPY | Facility: CLINIC | Age: 59
End: 2024-06-21
Payer: COMMERCIAL

## 2024-06-25 ENCOUNTER — APPOINTMENT (OUTPATIENT)
Dept: ORTHOPEDIC SURGERY | Facility: CLINIC | Age: 59
End: 2024-06-25
Payer: COMMERCIAL

## 2024-06-25 DIAGNOSIS — Z96.652 STATUS POST TOTAL LEFT KNEE REPLACEMENT: ICD-10-CM

## 2024-06-25 PROCEDURE — 99024 POSTOP FOLLOW-UP VISIT: CPT | Performed by: STUDENT IN AN ORGANIZED HEALTH CARE EDUCATION/TRAINING PROGRAM

## 2024-06-25 ASSESSMENT — PAIN - FUNCTIONAL ASSESSMENT: PAIN_FUNCTIONAL_ASSESSMENT: 0-10

## 2024-06-25 ASSESSMENT — PAIN DESCRIPTION - DESCRIPTORS: DESCRIPTORS: ACHING

## 2024-06-28 ENCOUNTER — APPOINTMENT (OUTPATIENT)
Dept: PHYSICAL THERAPY | Facility: CLINIC | Age: 59
End: 2024-06-28
Payer: COMMERCIAL

## 2024-07-18 NOTE — PROGRESS NOTES
PRIMARY CARE PHYSICIAN: Rocky Logan MD    ORTHOPAEDIC POSTOPERATIVE VISIT    ASSESSMENT & PLAN    Impression: 58 y.o. female 12 weeks and 1 day postop s/p left knee medial sided unicompartmental knee arthroplasty.  Overall, the patient is doing incredibly well.  She has regained significant motion and is no pain at baseline.    Plan:   At this time, she feels she continues to improve with physical therapy and would like to continue the sessions.  She has no limitations in regards to the left knee at this time.    I reviewed the intraoperative findings with the patient and answered all their questions. I reviewed their postoperative timeline and plan with them. They understand the postoperative precautions and the treatment plan going forward.     Follow-Up: Patient will follow-up in 12 weeks for repeat evaluation and x-ray.  12 weeks 1 day    At the end of the visit, all questions were answered in full. The patient is in agreement with the plan and recommendations. They will call the office with any questions/concerns.    Note dictated with [employer] software. Completed without full typed error editing and sent to avoid delay.    SUBJECTIVE  CHIEF COMPLAINT:   Chief Complaint   Patient presents with    Left Knee - Follow-up     Sx 4/1/24, States  knee is doing good.  Has been going to PT.        HPI: Macie Gavin is a 58 y.o. patient. Macie Gavin is now 12 weeks and 1 day postop status post left knee medial sided unicompartmental knee arthroplasty.  Overall, the patient is doing incredibly well.  She has no pain and is not on any narcotic or over-the-counter anti-inflammatory/pain relief medication.  No reported issues with her surgical incision.  She continues to deny any distal numbness or tingling.  The patient has been utilizing the physical therapy team and has regained a significant amount of motion.  She is overall extremely happy with her progress and feels she has improved  significantly in regards to her pain tolerance and ability to ambulate comfortably.  No new issues or acute complaints at this time.    REVIEW OF SYSTEMS  Constitutional: See HPI for pain assessment, No significant weight loss, recent trauma  Cardiovascular: No chest pain, shortness of breath  Respiratory: No difficulty breathing, cough  Gastrointestinal: No nausea, vomiting, diarrhea, constipation  Musculoskeletal: Noted in HPI, positive for pain, restricted motion, stiffness  Integumentary: No rashes, easy bruising, redness   Neurological: no numbness or tingling in extremities, no gait disturbances   Psychiatric: No mood changes, memory changes, social issues  Heme/Lymph: no excessive swelling, easy bruising, excessive bleeding  ENT: No hearing changes  Eyes: No vision changes    CURRENT MEDICATIONS:   Current Outpatient Medications   Medication Sig Dispense Refill    albuterol 90 mcg/actuation inhaler Inhale 2 puffs every 6 hours if needed.      budesonide-formoteroL (Symbicort) 160-4.5 mcg/actuation inhaler Inhale 2 puffs once daily as needed (ASTHMA EXERCISE INDUCED).      docusate sodium (Colace) 100 mg capsule Take 1 capsule (100 mg) by mouth 2 times a day as needed for constipation. 30 capsule 0    ergocalciferol (Vitamin D-2) 1.25 MG (97627 UT) capsule Take 1 capsule (1,250 mcg) by mouth 1 (one) time per week. MONDAY      estradiol (Estrace) 0.01 % (0.1 mg/gram) vaginal cream Insert 1 g into the vagina.  1-2 times per week as directed.      ibuprofen 800 mg tablet Take 1 tablet (800 mg) by mouth every 8 hours if needed for mild pain (1 - 3).      meloxicam (Mobic) 15 mg tablet Take 1 tablet (15 mg) by mouth once daily.      ondansetron (Zofran) 4 mg tablet Take 1 tablet (4 mg) by mouth every 8 hours if needed for nausea or vomiting. 20 tablet 0    oxyCODONE (Roxicodone) 5 mg immediate release tablet Take 1 tablet (5 mg) by mouth every 6 hours if needed for severe pain (7 - 10). 30 tablet 0    sertraline  (Zoloft) 100 mg tablet Take 1 tablet (100 mg) by mouth once daily.       No current facility-administered medications for this visit.        OBJECTIVE    PHYSICAL EXAM      4/1/2024     2:35 PM 4/1/2024     2:50 PM 4/1/2024     3:05 PM 4/1/2024     3:20 PM 4/1/2024     3:35 PM 4/1/2024     3:57 PM 4/1/2024     5:00 PM   Vitals   Systolic 131 144 127 141 137  133   Diastolic 74 81 81 78 75  74   Heart Rate 65 61 67 63 70 65 74   Temp  36.5 °C (97.7 °F)  36.5 °C (97.7 °F)   36.4 °C (97.5 °F)   Resp 16 18 20 18 16 18 16      There is no height or weight on file to calculate BMI.    General: Well-appearing, no acute distress    Skin intact bilateral upper and lower extremities  No erythema  No warmth    Detailed examination of left knee demonstrates:  Surgical incisions well-healed.  No erythema or warmth  No drainage  No ecchymosis  Minimal effusion  Resolving swelling, minimal  Knee range of motion: 0-120 degrees without pain.  Patella mobility 1+ medial and lateral  Lower extremity motor grossly intact  L4-S1 sensation intact bilaterally  2+ DP/PT pulses bilaterally  Warm and well-perfused, brisk capillary refill    IMAGING:   AP and lateral x-rays of the left knee reveal well-placed medial unicompartmental knee arthroplasty.  There is no evidence of loosening.  No acute fracture or changes in the implant.    Juan Pablo Guy MD, MPH  Attending Surgeon    Sports Medicine Orthopaedic Surgery  [facility]

## 2024-09-17 ENCOUNTER — APPOINTMENT (OUTPATIENT)
Dept: ORTHOPEDIC SURGERY | Facility: CLINIC | Age: 59
End: 2024-09-17
Payer: COMMERCIAL

## 2024-09-17 ENCOUNTER — CLINICAL SUPPORT (OUTPATIENT)
Dept: ORTHOPEDIC SURGERY | Facility: CLINIC | Age: 59
End: 2024-09-17
Payer: COMMERCIAL

## 2024-09-17 DIAGNOSIS — Z96.652 STATUS POST TOTAL LEFT KNEE REPLACEMENT: ICD-10-CM

## 2024-09-17 PROCEDURE — 99213 OFFICE O/P EST LOW 20 MIN: CPT | Performed by: STUDENT IN AN ORGANIZED HEALTH CARE EDUCATION/TRAINING PROGRAM

## 2024-09-17 ASSESSMENT — PAIN DESCRIPTION - DESCRIPTORS: DESCRIPTORS: ACHING

## 2024-09-17 ASSESSMENT — PAIN - FUNCTIONAL ASSESSMENT: PAIN_FUNCTIONAL_ASSESSMENT: 0-10

## 2024-09-17 ASSESSMENT — PAIN SCALES - GENERAL: PAINLEVEL_OUTOF10: 2

## 2024-10-02 NOTE — PROGRESS NOTES
PRIMARY CARE PHYSICIAN: Rocky Logan MD    ORTHOPAEDIC POSTOPERATIVE VISIT    ASSESSMENT & PLAN    Impression: 58 y.o. female months and 16 days postop s/p left medial sided unicompartmental knee arthroplasty.  Overall, the patient is doing well.      Plan:   At this time, I recommended continuing on without any restrictions.  She is at this time.    I reviewed the intraoperative findings with the patient and answered all their questions. I reviewed their postoperative timeline and plan with them. They understand the postoperative precautions and the treatment plan going forward.     Follow-Up: Patient will follow-up for 1 year follow-up.    At the end of the visit, all questions were answered in full. The patient is in agreement with the plan and recommendations. They will call the office with any questions/concerns.    Note dictated with [employer] software. Completed without full typed error editing and sent to avoid delay.    SUBJECTIVE  CHIEF COMPLAINT:   Chief Complaint   Patient presents with    Left Knee - Follow-up     Sx 4/1/24 States knee started to hurt on the left side of the knee.  States she did start walking a little further and did start kneeling on it. But nothing other than that.         HPI: Macie Gavin is a 58 y.o. patient. Macie Gavin is now 5 months and 16 days status post left-sided medial compartmental uni arthroplasty.  The patient has been recovering well as complete her physical therapy.  She feels she has full range of motion.  She is ambulating without restriction.  She does have some occasional lateral sided knee pain that is just distal to the joint line.  This began when she began kneeling on the joint.  It is not overly bothersome to her and has come and gone over the last 1 to 2 weeks.  No new injuries or falls.  No issues with her surgical incision.  She denies any distal numbness or tingling.    REVIEW OF SYSTEMS  Constitutional: See HPI for pain  assessment, No significant weight loss, recent trauma  Cardiovascular: No chest pain, shortness of breath  Respiratory: No difficulty breathing, cough  Gastrointestinal: No nausea, vomiting, diarrhea, constipation  Musculoskeletal: Noted in HPI, positive for pain, restricted motion, stiffness  Integumentary: No rashes, easy bruising, redness   Neurological: no numbness or tingling in extremities, no gait disturbances   Psychiatric: No mood changes, memory changes, social issues  Heme/Lymph: no excessive swelling, easy bruising, excessive bleeding  ENT: No hearing changes  Eyes: No vision changes    CURRENT MEDICATIONS:   Current Outpatient Medications   Medication Sig Dispense Refill    albuterol 90 mcg/actuation inhaler Inhale 2 puffs every 6 hours if needed.      budesonide-formoteroL (Symbicort) 160-4.5 mcg/actuation inhaler Inhale 2 puffs once daily as needed (ASTHMA EXERCISE INDUCED).      docusate sodium (Colace) 100 mg capsule Take 1 capsule (100 mg) by mouth 2 times a day as needed for constipation. 30 capsule 0    ergocalciferol (Vitamin D-2) 1.25 MG (18596 UT) capsule Take 1 capsule (1,250 mcg) by mouth 1 (one) time per week. MONDAY      estradiol (Estrace) 0.01 % (0.1 mg/gram) vaginal cream Insert 1 g into the vagina.  1-2 times per week as directed.      ibuprofen 800 mg tablet Take 1 tablet (800 mg) by mouth every 8 hours if needed for mild pain (1 - 3).      meloxicam (Mobic) 15 mg tablet Take 1 tablet (15 mg) by mouth once daily.      ondansetron (Zofran) 4 mg tablet Take 1 tablet (4 mg) by mouth every 8 hours if needed for nausea or vomiting. 20 tablet 0    oxyCODONE (Roxicodone) 5 mg immediate release tablet Take 1 tablet (5 mg) by mouth every 6 hours if needed for severe pain (7 - 10). 30 tablet 0    sertraline (Zoloft) 100 mg tablet Take 1 tablet (100 mg) by mouth once daily.       No current facility-administered medications for this visit.        OBJECTIVE    PHYSICAL EXAM      4/1/2024     2:35  PM 4/1/2024     2:50 PM 4/1/2024     3:05 PM 4/1/2024     3:20 PM 4/1/2024     3:35 PM 4/1/2024     3:57 PM 4/1/2024     5:00 PM   Vitals   Systolic 131 144 127 141 137  133   Diastolic 74 81 81 78 75  74   Heart Rate 65 61 67 63 70 65 74   Temp  36.5 °C (97.7 °F)  36.5 °C (97.7 °F)   36.4 °C (97.5 °F)   Resp 16 18 20 18 16 18 16      There is no height or weight on file to calculate BMI.    General: Well-appearing, no acute distress    Skin intact bilateral upper and lower extremities  No erythema  No warmth    Surgical incisions well-healed.  No erythema or warmth  No drainage  No ecchymosis  Minimal effusion  Resolving swelling, minimal  Knee range of motion: 0-120 degrees without pain.  There is no tenderness along the medial or lateral joint line.  She does have some mild tenderness just distal to the lateral joint line at the lateral border over her incision  Patella mobility 1+ medial and lateral  Lower extremity motor grossly intact  L4-S1 sensation intact bilaterally  2+ DP/PT pulses bilaterally  Warm and well-perfused, brisk capillary refill    IMAGING:   AP and lateral views of the left knee reveal well-placed tibial femoral unicompartmental knee arthroplasty without evidence of loosening.    Juan Pablo Guy MD, MPH  Attending Surgeon    Sports Medicine Orthopaedic Surgery  [facility] [place]  [facility]

## 2024-11-01 ENCOUNTER — HOSPITAL ENCOUNTER (OUTPATIENT)
Dept: RADIOLOGY | Facility: CLINIC | Age: 59
Discharge: HOME | End: 2024-11-01
Payer: COMMERCIAL

## 2024-11-01 ENCOUNTER — OFFICE VISIT (OUTPATIENT)
Dept: ORTHOPEDIC SURGERY | Facility: CLINIC | Age: 59
End: 2024-11-01
Payer: COMMERCIAL

## 2024-11-01 DIAGNOSIS — M25.561 RIGHT KNEE PAIN, UNSPECIFIED CHRONICITY: ICD-10-CM

## 2024-11-01 PROCEDURE — 99213 OFFICE O/P EST LOW 20 MIN: CPT | Performed by: STUDENT IN AN ORGANIZED HEALTH CARE EDUCATION/TRAINING PROGRAM

## 2024-11-01 PROCEDURE — 73564 X-RAY EXAM KNEE 4 OR MORE: CPT | Mod: RT

## 2024-11-01 PROCEDURE — 99213 OFFICE O/P EST LOW 20 MIN: CPT | Mod: 25 | Performed by: STUDENT IN AN ORGANIZED HEALTH CARE EDUCATION/TRAINING PROGRAM

## 2024-11-01 ASSESSMENT — PAIN - FUNCTIONAL ASSESSMENT: PAIN_FUNCTIONAL_ASSESSMENT: 0-10

## 2024-11-01 ASSESSMENT — PAIN DESCRIPTION - DESCRIPTORS: DESCRIPTORS: THROBBING;RADIATING

## 2024-11-01 ASSESSMENT — PAIN SCALES - GENERAL: PAINLEVEL_OUTOF10: 0 - NO PAIN

## 2024-11-06 NOTE — PROGRESS NOTES
PRIMARY CARE PHYSICIAN: Rocky Logan MD  REFERRING PROVIDER: No referring provider defined for this encounter.     CONSULT ORTHOPAEDIC: Knee Evaluation    ASSESSMENT & PLAN    Impression/Plan: The patient is status post left knee medial sided UKA on 4/1/2024 overall doing well.  At this time, she has similar symptoms in regards to the right knee.  I have recommended a course of a home exercise program and she was provided documentation for this.  In addition, I have provided her a corticosteroid injection to the right knee.  She will follow-up in roughly 12 weeks for repeat evaluation discussion of her progress.      SUBJECTIVE  CHIEF COMPLAINT:   Chief Complaint   Patient presents with    Right Knee - Pain     Est new problem.  Right knee pain no injury.  Pain started about 4 to 5 weeks ago. States painful when sitting at work or in the car and it soar to the touch.  Pain when she's relax not with use of knee.         HPI: Macie Gavin is a 58 y.o. patient.  Presents status post left knee left-sided medial UKA performed on 4/1/2024.  In regards to left knee, the patient is doing very well.  She has no pain and is no longer on any narcotic or over-the-counter medication.  She is back to all activities without restriction.  She has no reports of distal numbness or tingling.  No reports of surgical site issues.    She now has increasing pain to the right knee, similar to the left side.  The pain is along the medial joint line there is occasional mechanical symptoms of clicking and popping.  The pain comes and goes and is relieved with over-the-counter Tylenol and ibuprofen.      REVIEW OF SYSTEMS  Constitutional: See HPI for pain assessment, No significant weight loss, recent trauma  Cardiovascular: No chest pain, shortness of breath  Respiratory: No difficulty breathing, cough  Gastrointestinal: No nausea, vomiting, diarrhea, constipation  Musculoskeletal: Noted in HPI, positive for pain, restricted motion,  stiffness  Integumentary: No rashes, easy bruising, redness   Neurological: no numbness or tingling in extremities, no gait disturbances   Psychiatric: No mood changes, memory changes, social issues  Heme/Lymph: no excessive swelling, easy bruising, excessive bleeding  ENT: No hearing changes  Eyes: No vision changes    Past Medical History:   Diagnosis Date    Anxiety     Depression     Exercise-induced asthma ([facility])     Insomnia     OCD (obsessive compulsive disorder)         No Known Allergies     Past Surgical History:   Procedure Laterality Date    BLADDER SUSPENSION      CERVICAL FUSION       SECTION, LOW TRANSVERSE      COLONOSCOPY      HYSTERECTOMY      TUBAL LIGATION      VAGINAL PROLAPSE REPAIR          No family history on file.     Social History     Socioeconomic History    Marital status:      Spouse name: Not on file    Number of children: Not on file    Years of education: Not on file    Highest education level: Not on file   Occupational History    Not on file   Tobacco Use    Smoking status: Never    Smokeless tobacco: Never   Vaping Use    Vaping status: Never Used   Substance and Sexual Activity    Alcohol use: Yes     Comment: Mixed drinks at least once a month    Drug use: Never    Sexual activity: Not on file   Other Topics Concern    Not on file   Social History Narrative    Not on file     Social Drivers of Health     Financial Resource Strain: Not on file   Food Insecurity: Not on file   Transportation Needs: Not on file   Physical Activity: Not on file   Stress: Not on file   Social Connections: Not on file   Intimate Partner Violence: Not on file   Housing Stability: Not on file        CURRENT MEDICATIONS:   Current Outpatient Medications   Medication Sig Dispense Refill    albuterol 90 mcg/actuation inhaler Inhale 2 puffs every 6 hours if needed.      budesonide-formoteroL (Symbicort) 160-4.5 mcg/actuation inhaler Inhale 2 puffs once daily as needed (ASTHMA EXERCISE  INDUCED).      docusate sodium (Colace) 100 mg capsule Take 1 capsule (100 mg) by mouth 2 times a day as needed for constipation. 30 capsule 0    ergocalciferol (Vitamin D-2) 1.25 MG (19658 UT) capsule Take 1 capsule (1,250 mcg) by mouth 1 (one) time per week. MONDAY      estradiol (Estrace) 0.01 % (0.1 mg/gram) vaginal cream Insert 1 g into the vagina.  1-2 times per week as directed.      ibuprofen 800 mg tablet Take 1 tablet (800 mg) by mouth every 8 hours if needed for mild pain (1 - 3).      meloxicam (Mobic) 15 mg tablet Take 1 tablet (15 mg) by mouth once daily.      ondansetron (Zofran) 4 mg tablet Take 1 tablet (4 mg) by mouth every 8 hours if needed for nausea or vomiting. 20 tablet 0    oxyCODONE (Roxicodone) 5 mg immediate release tablet Take 1 tablet (5 mg) by mouth every 6 hours if needed for severe pain (7 - 10). 30 tablet 0    sertraline (Zoloft) 100 mg tablet Take 1 tablet (100 mg) by mouth once daily.       No current facility-administered medications for this visit.        OBJECTIVE    PHYSICAL EXAM      4/1/2024     2:35 PM 4/1/2024     2:50 PM 4/1/2024     3:05 PM 4/1/2024     3:20 PM 4/1/2024     3:35 PM 4/1/2024     3:57 PM 4/1/2024     5:00 PM   Vitals   Systolic 131 144 127 141 137  133   Diastolic 74 81 81 78 75  74   Heart Rate 65 61 67 63 70 65 74   Temp  36.5 °C (97.7 °F)  36.5 °C (97.7 °F)   36.4 °C (97.5 °F)   Resp 16 18 20 18 16 18 16      There is no height or weight on file to calculate BMI.    GENERAL: A/Ox3, NAD. Appears healthy, well nourished  PSYCHIATRIC: Mood stable, appropriate memory recall  EYES: EOM intact, no scleral icterus  CARDIAC: regular rate  LUNGS: Breathing non-labored  SKIN: no erythema, rashes, or ecchymoses     MUSCULOSKELETAL:  No erythema or warmth  No drainage  No ecchymosis  Minimal effusion  Resolving swelling, minimal  Knee range of motion: 0-120 degrees without pain.  There is no tenderness along the medial or lateral joint line.  She does have some mild  tenderness just distal to the lateral joint line at the lateral border over her incision  Patella mobility 1+ medial and lateral  Lower extremity motor grossly intact  L4-S1 sensation intact bilaterally  2+ DP/PT pulses bilaterally  Warm and well-perfused, brisk capillary refill    Evaluation of the right knee reveals full range of motion from 0 to 120 degrees.  There is mild to moderate tenderness palpation along the medial joint line.    NEUROVASCULAR:  - Neurovascular Status: sensation intact to light touch distally, lower extremity motor intact  - Capillary refill brisk at extremities, Bilateral dorsalis pedis pulse 2+    Imaging: Multiple views of the affected right knee(s) demonstrate: No evidence of acute fracture or dislocation.  There is mild joint space narrowing with osteophytic formation of the medial compartment.   X-rays were personally reviewed and interpreted by me.  Radiology reports were reviewed by me as well, if readily available at the time.    L Inj/Asp: R knee on 12/27/2024 3:12 PM  Indications: pain  Details: 22 G needle, superolateral approach  Medications: 80 mg triamcinolone acetonide 40 mg/mL; 4 mL lidocaine PF 10 mg/mL (1 %); 4 mL ropivacaine 5 mg/mL (0.5 %)           Juan Pablo Guy MD, MPH  Attending Surgeon  Sports Medicine Orthopaedic Surgery  [facility] [place]

## 2024-12-17 ENCOUNTER — APPOINTMENT (OUTPATIENT)
Dept: ORTHOPEDIC SURGERY | Facility: CLINIC | Age: 59
End: 2024-12-17
Payer: COMMERCIAL

## 2024-12-24 ENCOUNTER — TELEPHONE (OUTPATIENT)
Dept: ORTHOPEDIC SURGERY | Facility: HOSPITAL | Age: 59
End: 2024-12-24
Payer: COMMERCIAL

## 2024-12-24 NOTE — TELEPHONE ENCOUNTER
The doctor will not be in clinic at the time of her telehealth appointment so I will need to move her telehealth visit to the evening. I have asked Mrs. Gavin to give the office a call back on her voicemail.

## 2024-12-27 PROCEDURE — 20610 DRAIN/INJ JOINT/BURSA W/O US: CPT | Performed by: STUDENT IN AN ORGANIZED HEALTH CARE EDUCATION/TRAINING PROGRAM

## 2024-12-27 RX ORDER — TRIAMCINOLONE ACETONIDE 40 MG/ML
80 INJECTION, SUSPENSION INTRA-ARTICULAR; INTRAMUSCULAR
Status: COMPLETED | OUTPATIENT
Start: 2024-12-27 | End: 2024-12-27

## 2024-12-27 RX ORDER — LIDOCAINE HYDROCHLORIDE 10 MG/ML
4 INJECTION, SOLUTION EPIDURAL; INFILTRATION; INTRACAUDAL; PERINEURAL
Status: COMPLETED | OUTPATIENT
Start: 2024-12-27 | End: 2024-12-27

## 2024-12-27 RX ORDER — ROPIVACAINE HYDROCHLORIDE 5 MG/ML
4 INJECTION, SOLUTION EPIDURAL; INFILTRATION; PERINEURAL
Status: COMPLETED | OUTPATIENT
Start: 2024-12-27 | End: 2024-12-27

## 2024-12-31 ENCOUNTER — APPOINTMENT (OUTPATIENT)
Dept: ORTHOPEDIC SURGERY | Facility: CLINIC | Age: 59
End: 2024-12-31
Payer: COMMERCIAL

## 2025-01-07 ENCOUNTER — APPOINTMENT (OUTPATIENT)
Dept: ORTHOPEDIC SURGERY | Facility: CLINIC | Age: 60
End: 2025-01-07
Payer: COMMERCIAL

## 2025-01-17 ENCOUNTER — OFFICE VISIT (OUTPATIENT)
Dept: URGENT CARE | Age: 60
End: 2025-01-17
Payer: COMMERCIAL

## 2025-01-17 VITALS
TEMPERATURE: 98.6 F | RESPIRATION RATE: 16 BRPM | HEART RATE: 73 BPM | DIASTOLIC BLOOD PRESSURE: 79 MMHG | OXYGEN SATURATION: 95 % | SYSTOLIC BLOOD PRESSURE: 129 MMHG

## 2025-01-17 DIAGNOSIS — J02.9 SORE THROAT: ICD-10-CM

## 2025-01-17 DIAGNOSIS — J06.9 VIRAL URI WITH COUGH: Primary | ICD-10-CM

## 2025-01-17 LAB
POC RAPID STREP: NEGATIVE
POC SARS-COV-2 AG BINAX: NORMAL

## 2025-01-17 RX ORDER — PREDNISONE 20 MG/1
20 TABLET ORAL 2 TIMES DAILY
Qty: 10 TABLET | Refills: 0 | Status: SHIPPED | OUTPATIENT
Start: 2025-01-17 | End: 2025-01-22

## 2025-01-17 RX ORDER — PREDNISONE 20 MG/1
20 TABLET ORAL 2 TIMES DAILY
Qty: 10 TABLET | Refills: 0 | Status: SHIPPED | OUTPATIENT
Start: 2025-01-17 | End: 2025-01-17 | Stop reason: HOSPADM

## 2025-01-17 ASSESSMENT — ENCOUNTER SYMPTOMS
ALLERGIC/IMMUNOLOGIC NEGATIVE: 1
NEUROLOGICAL NEGATIVE: 1
GASTROINTESTINAL NEGATIVE: 1
EYES NEGATIVE: 1
ENDOCRINE NEGATIVE: 1
MUSCULOSKELETAL NEGATIVE: 1
RHINORRHEA: 1
SORE THROAT: 1
CONSTITUTIONAL NEGATIVE: 1
CARDIOVASCULAR NEGATIVE: 1
PSYCHIATRIC NEGATIVE: 1
COUGH: 1
HEMATOLOGIC/LYMPHATIC NEGATIVE: 1

## 2025-01-17 NOTE — PROGRESS NOTES
Subjective   Patient ID: Macie Gavin is a 59 y.o. female. They present today with a chief complaint of Sore Throat and Nasal Congestion.    History of Present Illness  Patient is a 58 y/o female c/o nasal congestion/drainage, dry cough, sore throat x4 days. Patient denies symptoms of fever, chills, bodyaches, lethargy, weakness, chest pain/tightness/pressure, SOB, wheezing, N/V/D, ABD pain. No OTC medication reported to be taken.       Sore Throat   Associated symptoms include congestion and coughing.       Past Medical History  Allergies as of 2025    (No Known Allergies)       (Not in a hospital admission)       Past Medical History:   Diagnosis Date    Anxiety     Depression     Exercise-induced asthma ([facility])     Insomnia     OCD (obsessive compulsive disorder)        Past Surgical History:   Procedure Laterality Date    BLADDER SUSPENSION      CERVICAL FUSION       SECTION, LOW TRANSVERSE      COLONOSCOPY      HYSTERECTOMY      TUBAL LIGATION      VAGINAL PROLAPSE REPAIR          reports that she has never smoked. She has never used smokeless tobacco. She reports current alcohol use. She reports that she does not use drugs.    Review of Systems  Review of Systems   Constitutional: Negative.    HENT:  Positive for congestion, rhinorrhea and sore throat.    Eyes: Negative.    Respiratory:  Positive for cough.    Cardiovascular: Negative.    Gastrointestinal: Negative.    Endocrine: Negative.    Genitourinary: Negative.    Musculoskeletal: Negative.    Skin: Negative.    Allergic/Immunologic: Negative.    Neurological: Negative.    Hematological: Negative.    Psychiatric/Behavioral: Negative.                                    Objective    Vitals:    25 0923   BP: 129/79   Pulse: 73   Resp: 16   Temp: 37 °C (98.6 °F)   SpO2: 95%     No LMP recorded. Patient has had a hysterectomy.    Physical Exam  Constitutional:       Comments: Patient A/O x4, LOC 5, calm and cooperative. Patient  self-ambulatory to treatment area and is in no acute distress.    HENT:      Head: Normocephalic and atraumatic.      Right Ear: Tympanic membrane normal.      Left Ear: Tympanic membrane normal.      Nose:      Comments: No edema, erythema to bilateral inferior turbinates. Trace amounts of serous drainage from nares. No frontal or maxillary sinus pressure to palpation   Eyes:      Extraocular Movements: Extraocular movements intact.      Conjunctiva/sclera: Conjunctivae normal.      Pupils: Pupils are equal, round, and reactive to light.   Cardiovascular:      Rate and Rhythm: Normal rate and regular rhythm.      Pulses: Normal pulses.      Heart sounds: Normal heart sounds.   Pulmonary:      Comments: No audible cough during physical examination. All lungfields clear to roomair per auscultation. Patient speaking in complete sentences without SOB or difficulty. Patient in no acute respiratory distress   Abdominal:      General: Abdomen is flat.      Palpations: Abdomen is soft.   Musculoskeletal:         General: Normal range of motion.      Cervical back: Neck supple.   Skin:     General: Skin is warm and dry.      Capillary Refill: Capillary refill takes less than 2 seconds.   Neurological:      General: No focal deficit present.      Mental Status: She is oriented to person, place, and time.   Psychiatric:         Mood and Affect: Mood normal.         Behavior: Behavior normal.         Procedures    Point of Care Test & Imaging Results from this visit  Results for orders placed or performed in visit on 01/17/25   POCT Covid-19 Rapid Antigen   Result Value Ref Range    POC ZONIA-COV-2 AG  Presumptive negative test for SARS-CoV-2 (no antigen detected)     Presumptive negative test for SARS-CoV-2 (no antigen detected)   POCT rapid strep A manually resulted   Result Value Ref Range    POC Rapid Strep Negative Negative      No results found.    Diagnostic study results (if any) were reviewed by Emmy Villarreal  APRN-CNP.    Assessment/Plan   Allergies, medications, history, and pertinent labs/EKGs/Imaging reviewed by AMOS Cabrera.     Medical Decision Making  COVID-19, rapid strep negative in office. Symptoms likely viral URI with cough at time of visit. Will provide DocRx Prednisone for patient for symptom support. OTC Motrin/Tylenol/Decongestants as needed. Discussed symptom/illness trajectory with patient.     At time of discharge, patient was clinically well-appearing and appropriate for outpatient management. The patient/parent/guardian was educated regarding diagnosis, supportive care, OTC and Rx medications. The patient/parent/guardian was given the opportunity to ask questions prior to discharge. They verbalized understanding of discussion of treatment plan, expected course of illness and/or injury, indications on when to return to , when to seek further evaluation in ED/call 911, and the need to follow up with PCP and/or specialist as referred. Patient/parent/guardian was provided with work/school documentation if requested. Patient stable upon discharge.     Orders and Diagnoses  Diagnoses and all orders for this visit:  Viral URI with cough  -     predniSONE (Deltasone) 20 mg tablet; Take 1 tablet (20 mg) by mouth 2 times a day for 10 doses.  Sore throat  -     POCT Covid-19 Rapid Antigen  -     POCT rapid strep A manually resulted      Medical Admin Record      Patient disposition: Home    Electronically signed by AMOS Cabrera  9:32 AM

## 2025-03-06 NOTE — PROGRESS NOTES
"      Physical Therapy Treatment    Patient Name: Macie Gavin  MRN: [id number]  Today's Date: 5/30/2024  Time Calculation  Start Time: 1107  Stop Time: 1157  Time Calculation (min): 50 min        Insurance   Visit:  14 of 60  Auth required: No  Payor: [facility] / Plan: [facility] / Product Type: *No Product type* /     Current Problem:  1. Aftercare following left knee joint replacement surgery  Follow Up In Physical Therapy       2. Acute pain of left knee  Follow Up In Physical Therapy       Subjective:  I have been able to do some of my exercises at work sitting.    Pain:  Pain Assessment: 0-10  Pain Score: 0 - No pain    Objective:  AROM L knee  0-6-130-pre  0-4-130    Precautions  Precautions  Medical Precautions: No known precautions/limitation    Treatments:  Therapeutic Exercise 65187: Unit(s)-1  Recumbent bike seat  7 x  Seated Quad set with hands for feedback L LE x 15x6\" hold  Standing TKE with QS 1x10 x 6\" hold black  Reviewed HEP -focusing on exentensiion  Manual Therapy 28867: Unit(s)- 2  Gr III Patellar glides 2x10  Inferior superior and med/lat  Static stretch into extension with OP x 30sec x3  Seated PROM-Flexion/Extension w/ distraction x 20min- focusing on extension stretching.  Assessment: Improved mobility and quad function as session progress.  Patient demonstrated imrpoved quad contraction when performing quad sets, compared to last visit.   Plan: Continue work to improve quad function and decrease co-contraction of hamstrings and quad.  " IMPROVE-DD Application Not Available

## 2025-04-08 ENCOUNTER — APPOINTMENT (OUTPATIENT)
Dept: ORTHOPEDIC SURGERY | Facility: CLINIC | Age: 60
End: 2025-04-08
Payer: COMMERCIAL

## 2025-04-08 ENCOUNTER — ANCILLARY PROCEDURE (OUTPATIENT)
Dept: ORTHOPEDIC SURGERY | Facility: CLINIC | Age: 60
End: 2025-04-08
Payer: COMMERCIAL

## 2025-04-08 DIAGNOSIS — Z96.652 STATUS POST LEFT PARTIAL KNEE REPLACEMENT: ICD-10-CM

## 2025-04-08 PROCEDURE — 99213 OFFICE O/P EST LOW 20 MIN: CPT | Performed by: STUDENT IN AN ORGANIZED HEALTH CARE EDUCATION/TRAINING PROGRAM

## 2025-04-08 PROCEDURE — 73560 X-RAY EXAM OF KNEE 1 OR 2: CPT | Mod: LEFT SIDE | Performed by: STUDENT IN AN ORGANIZED HEALTH CARE EDUCATION/TRAINING PROGRAM

## 2025-04-15 NOTE — PROGRESS NOTES
PRIMARY CARE PHYSICIAN: Rocky Logan MD    ORTHOPAEDIC POSTOPERATIVE VISIT    ASSESSMENT & PLAN    Impression: 59 y.o. female 1 year postop s/p left medial sided partial knee replacement.  Overall, the patient is recovering well.  She had a fall 2 months ago resulting in left proximal tibia pain.  At this time, her implants are stable.  I feel she has suffered a minor contusion to the lateral tibial plateau.  She has no restrictions at this time.  She will follow-up in the next 6-8 weeks to discuss progress.  If she fails to improve at that time we may consider MRI for further evaluation of the lateral side of the joint.    Plan:   As described above.    I reviewed the intraoperative findings with the patient and answered all their questions. I reviewed their postoperative timeline and plan with them. They understand the postoperative precautions and the treatment plan going forward.     Follow-Up: Patient will follow-up 6-8 weeks.    At the end of the visit, all questions were answered in full. The patient is in agreement with the plan and recommendations. They will call the office with any questions/concerns.    Note dictated with [employer] software. Completed without full typed error editing and sent to avoid delay.    SUBJECTIVE  CHIEF COMPLAINT:   Chief Complaint   Patient presents with    Left Knee - Post-op     Partial replacement Sx  4/1/24- Not doing great. Broke R ankle 2 months ago and fell on L knee        HPI: Macie Gavin is a 59 y.o. patient. Macie Gavin is now 1 year postop status post left medial sided partial knee replacement.  Overall, the patient is recovering currently well.  2 months ago however, she fell onto the left side resulting in a right ankle fracture which was treated nonoperatively.  She did have a partial/limited weightbearing status and put increasingly onto the left side.  She believes she may have fallen onto the left lateral aspect of the knee.  She  has no subjective instability.  The pain is along the lateral aspect of the joint with increasing distances.  No other complaints at this time.    REVIEW OF SYSTEMS  Constitutional: See HPI for pain assessment, No significant weight loss, recent trauma  Cardiovascular: No chest pain, shortness of breath  Respiratory: No difficulty breathing, cough  Gastrointestinal: No nausea, vomiting, diarrhea, constipation  Musculoskeletal: Noted in HPI, positive for pain, restricted motion, stiffness  Integumentary: No rashes, easy bruising, redness   Neurological: no numbness or tingling in extremities, no gait disturbances   Psychiatric: No mood changes, memory changes, social issues  Heme/Lymph: no excessive swelling, easy bruising, excessive bleeding  ENT: No hearing changes  Eyes: No vision changes    CURRENT MEDICATIONS:   Current Outpatient Medications   Medication Sig Dispense Refill    albuterol 90 mcg/actuation inhaler Inhale 2 puffs every 6 hours if needed.      budesonide-formoteroL (Symbicort) 160-4.5 mcg/actuation inhaler Inhale 2 puffs once daily as needed (ASTHMA EXERCISE INDUCED).      docusate sodium (Colace) 100 mg capsule Take 1 capsule (100 mg) by mouth 2 times a day as needed for constipation. 30 capsule 0    ergocalciferol (Vitamin D-2) 1.25 MG (27592 UT) capsule Take 1 capsule (1,250 mcg) by mouth 1 (one) time per week. MONDAY      estradiol (Estrace) 0.01 % (0.1 mg/gram) vaginal cream Insert 1 g into the vagina.  1-2 times per week as directed.      ibuprofen 800 mg tablet Take 1 tablet (800 mg) by mouth every 8 hours if needed for mild pain (1 - 3).      meloxicam (Mobic) 15 mg tablet Take 1 tablet (15 mg) by mouth once daily.      ondansetron (Zofran) 4 mg tablet Take 1 tablet (4 mg) by mouth every 8 hours if needed for nausea or vomiting. 20 tablet 0    oxyCODONE (Roxicodone) 5 mg immediate release tablet Take 1 tablet (5 mg) by mouth every 6 hours if needed for severe pain (7 - 10). 30 tablet 0     sertraline (Zoloft) 100 mg tablet Take 1 tablet (100 mg) by mouth once daily.       No current facility-administered medications for this visit.        OBJECTIVE    PHYSICAL EXAM      4/1/2024     2:50 PM 4/1/2024     3:05 PM 4/1/2024     3:20 PM 4/1/2024     3:35 PM 4/1/2024     3:57 PM 4/1/2024     5:00 PM 1/17/2025     9:23 AM   Vitals   Systolic 144 127 141 137  133 129   Diastolic 81 81 78 75  74 79   Heart Rate 61 67 63 70 65 74 73   Temp 36.5 °C (97.7 °F)  36.5 °C (97.7 °F)   36.4 °C (97.5 °F) 37 °C (98.6 °F)   Resp 18 20 18 16 18 16 16   Visit Report       Report      There is no height or weight on file to calculate BMI.    General: Well-appearing, no acute distress    Skin intact bilateral upper and lower extremities  No erythema  No warmth    No erythema or warmth  No drainage  No ecchymosis  Minimal effusion  Resolving swelling, minimal  Knee range of motion: 0-120 degrees without pain.  There is no tenderness along the medial or lateral joint line.  There is mild tenderness palpation along the lateral tibial plateau.  Patella mobility 1+ medial and lateral  Lower extremity motor grossly intact  L4-S1 sensation intact bilaterally  2+ DP/PT pulses bilaterally  Warm and well-perfused, brisk capillary refill    IMAGING:   AP and lateral imaging of the left knee reveal well-placed partial knee replacement without interval fracture or dislocation.    Juan Pablo Guy MD, MPH  Attending Surgeon    Sports Medicine Orthopaedic Surgery  [facility]

## (undated) DEVICE — Device

## (undated) DEVICE — IRRIGATION SYSTEM, WOUND, PULSAVAC PLUS

## (undated) DEVICE — BANDAGE, COFLEX, 6 X 5 YDS, TAN, STERILE, LF

## (undated) DEVICE — TRACKING KIT, TM KNEE PROCEDURES, VIZADISC

## (undated) DEVICE — CHECKPOINT KIT, FEMORAL/ TIBIAL

## (undated) DEVICE — GLOVE, SURGICAL, PROTEXIS PI , 7.5, PF, LF

## (undated) DEVICE — PROTECTIVE, FOOT, FOAM PAD & COHESIVE WRAP, STERILE

## (undated) DEVICE — 6MM STRYKER MAKO BALL BURR

## (undated) DEVICE — HOOD, SURGICAL, FLYTE HYBRID

## (undated) DEVICE — STRYKER RIO DRAPE KIT (FORMERLY MFR'D BY MAKO)

## (undated) DEVICE — BLADE, MAKO, SAGITTAL, NARROW

## (undated) DEVICE — CUFF, TOURNIQUET, 30 X 4, DUAL PORT/SNGL BLADDER, DISP, LF

## (undated) DEVICE — CATHETER, SUCTION, CATH-N-GLOVE, PEEL POUCH, 18 FR

## (undated) DEVICE — SUTURE, VICRYL, 1, 36 IN, CT-1, UNDYED

## (undated) DEVICE — DRAPE, INSTRUMENT, W/POUCH, STERI DRAPE, 7 X 11 IN, DISPOSABLE, STERILE

## (undated) DEVICE — CEMENT, MIXEVAC III, 10S BOWL, KNEES

## (undated) DEVICE — SYRINGE, 50 CC, LUER LOCK

## (undated) DEVICE — CATHETER TRAY, SURESTEP, 14FR, PRECONNECTED DRAIN BAG, PVC

## (undated) DEVICE — RESTRAINT, WRIST, XLONG, DISPOSABLE

## (undated) DEVICE — SOLUTION, INJECTION, USP, LACTATED RINGERS, LIFECARE, 1000ML

## (undated) DEVICE — SUTURE, VICRYL, 2-0, 18 IN CP-2, UNDYED

## (undated) DEVICE — GLOVE, SURGICAL, PROTEXIS PI BLUE W/NEUTHERA, 8.0, PF, LF